# Patient Record
Sex: MALE | Race: WHITE | Employment: FULL TIME | URBAN - NONMETROPOLITAN AREA
[De-identification: names, ages, dates, MRNs, and addresses within clinical notes are randomized per-mention and may not be internally consistent; named-entity substitution may affect disease eponyms.]

---

## 2021-03-01 ENCOUNTER — HOSPITAL ENCOUNTER (INPATIENT)
Age: 51
LOS: 3 days | Discharge: HOME | DRG: 897 | End: 2021-03-04
Attending: INTERNAL MEDICINE | Admitting: INTERNAL MEDICINE
Payer: COMMERCIAL

## 2021-03-01 DIAGNOSIS — F10.930 ALCOHOL WITHDRAWAL SYNDROME WITHOUT COMPLICATION (HCC): ICD-10-CM

## 2021-03-01 PROBLEM — F10.939 ALCOHOL WITHDRAWAL (HCC): Status: ACTIVE | Noted: 2021-03-01

## 2021-03-01 LAB
ALBUMIN SERPL-MCNC: 3.8 G/DL (ref 3.5–4.7)
ALBUMIN/GLOB SERPL: 1.2 {RATIO}
ALP SERPL-CCNC: 60 U/L (ref 38–126)
ALT SERPL-CCNC: 36 U/L (ref 3–72)
AMPHET UR QL SCN: NEGATIVE
ANION GAP SERPL CALC-SCNC: 9 MMOL/L
APPEARANCE UR: CLEAR
AST SERPL W P-5'-P-CCNC: 59 U/L (ref 17–74)
BARBITURATES UR QL SCN: NEGATIVE
BASOPHILS # BLD: 0 K/UL (ref 0–0.1)
BASOPHILS NFR BLD: 0 % (ref 0–2)
BENZODIAZ UR QL: NEGATIVE
BILIRUB SERPL-MCNC: 0.3 MG/DL (ref 0.2–1)
BILIRUB UR QL: NEGATIVE
BUN SERPL-MCNC: 13 MG/DL (ref 9–21)
BUN/CREAT SERPL: 19
CA-I BLD-MCNC: 8.8 MG/DL (ref 8.5–10.5)
CANNABINOIDS UR QL SCN: NEGATIVE
CHLORIDE SERPL-SCNC: 105 MMOL/L (ref 94–111)
CO2 SERPL-SCNC: 23 MMOL/L (ref 21–33)
COCAINE UR QL SCN: NEGATIVE
COLOR UR: NORMAL
COVID-19 RAPID TEST, COVR: NOT DETECTED
CREAT SERPL-MCNC: 0.7 MG/DL (ref 0.8–1.5)
EOSINOPHIL # BLD: 0 K/UL (ref 0–0.4)
EOSINOPHIL NFR BLD: 0 % (ref 0–5)
ERYTHROCYTE [DISTWIDTH] IN BLOOD BY AUTOMATED COUNT: 11.9 % (ref 11.6–14.5)
ETHANOL PERCENT, ALCP: 0.17 %
ETHANOL SERPL-MCNC: 170 MG/DL
GLOBULIN SER CALC-MCNC: 3.3 G/DL
GLUCOSE SERPL-MCNC: 96 MG/DL (ref 70–110)
GLUCOSE UR STRIP.AUTO-MCNC: NEGATIVE MG/DL
HCT VFR BLD AUTO: 39.8 % (ref 36–48)
HGB BLD-MCNC: 13.5 G/DL (ref 13–16)
HGB UR QL STRIP: NEGATIVE
IMM GRANULOCYTES # BLD AUTO: 0 K/UL
IMM GRANULOCYTES NFR BLD AUTO: 0 %
KETONES UR QL STRIP.AUTO: NEGATIVE MG/DL
LEUKOCYTE ESTERASE UR QL STRIP.AUTO: NEGATIVE
LYMPHOCYTES # BLD: 1.6 K/UL (ref 0.9–3.6)
LYMPHOCYTES NFR BLD: 21 % (ref 21–52)
MCH RBC QN AUTO: 31.4 PG (ref 24–34)
MCHC RBC AUTO-ENTMCNC: 33.9 G/DL (ref 31–37)
MCV RBC AUTO: 92.6 FL (ref 74–97)
METHADONE UR QL: NEGATIVE
MONOCYTES # BLD: 0.6 K/UL (ref 0.05–1.2)
MONOCYTES NFR BLD: 8 % (ref 3–10)
NEUTS SEG # BLD: 5.6 K/UL (ref 1.8–8)
NEUTS SEG NFR BLD: 71 % (ref 40–73)
NITRITE UR QL STRIP.AUTO: NEGATIVE
OPIATES UR QL: NEGATIVE
OXYCODONE UR QL SCN: NEGATIVE
PCP UR QL: NEGATIVE
PH UR STRIP: 6 [PH] (ref 5–9)
PLATELET # BLD AUTO: 414 K/UL (ref 135–420)
PMV BLD AUTO: 8.3 FL (ref 9.2–11.8)
POTASSIUM SERPL-SCNC: 3.7 MMOL/L (ref 3.2–5.1)
PROPOXYPH UR QL: NEGATIVE
PROT SERPL-MCNC: 7.1 G/DL (ref 6.1–8.4)
PROT UR STRIP-MCNC: NEGATIVE MG/DL
RBC # BLD AUTO: 4.3 M/UL (ref 4.7–5.5)
SARS-COV-2, COV2: NORMAL
SARS-COV-2, COV2: NORMAL
SODIUM SERPL-SCNC: 137 MMOL/L (ref 135–145)
SP GR UR REFRACTOMETRY: 1.01 (ref 1–1.03)
SPECIMEN SOURCE: NORMAL
TRICYCLICS UR QL: NEGATIVE
UROBILINOGEN UR QL STRIP.AUTO: 0.2 EU/DL (ref 0.2–1)
WBC # BLD AUTO: 7.9 K/UL (ref 4.6–13.2)

## 2021-03-01 PROCEDURE — U0003 INFECTIOUS AGENT DETECTION BY NUCLEIC ACID (DNA OR RNA); SEVERE ACUTE RESPIRATORY SYNDROME CORONAVIRUS 2 (SARS-COV-2) (CORONAVIRUS DISEASE [COVID-19]), AMPLIFIED PROBE TECHNIQUE, MAKING USE OF HIGH THROUGHPUT TECHNOLOGIES AS DESCRIBED BY CMS-2020-01-R: HCPCS

## 2021-03-01 PROCEDURE — 74011000302 HC RX REV CODE- 302: Performed by: INTERNAL MEDICINE

## 2021-03-01 PROCEDURE — 82746 ASSAY OF FOLIC ACID SERUM: CPT

## 2021-03-01 PROCEDURE — 86580 TB INTRADERMAL TEST: CPT | Performed by: INTERNAL MEDICINE

## 2021-03-01 PROCEDURE — 93005 ELECTROCARDIOGRAM TRACING: CPT

## 2021-03-01 PROCEDURE — 80307 DRUG TEST PRSMV CHEM ANLYZR: CPT

## 2021-03-01 PROCEDURE — 80053 COMPREHEN METABOLIC PANEL: CPT

## 2021-03-01 PROCEDURE — 81003 URINALYSIS AUTO W/O SCOPE: CPT

## 2021-03-01 PROCEDURE — 86592 SYPHILIS TEST NON-TREP QUAL: CPT

## 2021-03-01 PROCEDURE — 82077 ASSAY SPEC XCP UR&BREATH IA: CPT

## 2021-03-01 PROCEDURE — 74011250637 HC RX REV CODE- 250/637: Performed by: INTERNAL MEDICINE

## 2021-03-01 PROCEDURE — 82607 VITAMIN B-12: CPT

## 2021-03-01 PROCEDURE — 65310000001 HC RM PRIVATE DETOX

## 2021-03-01 PROCEDURE — 85025 COMPLETE CBC W/AUTO DIFF WBC: CPT

## 2021-03-01 PROCEDURE — 87635 SARS-COV-2 COVID-19 AMP PRB: CPT

## 2021-03-01 RX ORDER — DOCUSATE SODIUM 100 MG/1
100 CAPSULE, LIQUID FILLED ORAL 2 TIMES DAILY
Status: DISCONTINUED | OUTPATIENT
Start: 2021-03-01 | End: 2021-03-01

## 2021-03-01 RX ORDER — BUPROPION HYDROCHLORIDE 100 MG/1
100 TABLET, EXTENDED RELEASE ORAL 2 TIMES DAILY
Status: DISCONTINUED | OUTPATIENT
Start: 2021-03-01 | End: 2021-03-04 | Stop reason: HOSPADM

## 2021-03-01 RX ORDER — LORAZEPAM 1 MG/1
1 TABLET ORAL 3 TIMES DAILY
Status: DISCONTINUED | OUTPATIENT
Start: 2021-03-03 | End: 2021-03-03

## 2021-03-01 RX ORDER — HYDROXYZINE PAMOATE 25 MG/1
25 CAPSULE ORAL
Status: DISCONTINUED | OUTPATIENT
Start: 2021-03-01 | End: 2021-03-04 | Stop reason: HOSPADM

## 2021-03-01 RX ORDER — BUSPIRONE HYDROCHLORIDE 5 MG/1
10 TABLET ORAL 2 TIMES DAILY
Status: DISCONTINUED | OUTPATIENT
Start: 2021-03-01 | End: 2021-03-04 | Stop reason: HOSPADM

## 2021-03-01 RX ORDER — PANTOPRAZOLE SODIUM 40 MG/1
40 TABLET, DELAYED RELEASE ORAL
Status: DISCONTINUED | OUTPATIENT
Start: 2021-03-01 | End: 2021-03-04 | Stop reason: HOSPADM

## 2021-03-01 RX ORDER — VALSARTAN AND HYDROCHLOROTHIAZIDE 320; 25 MG/1; MG/1
1 TABLET, FILM COATED ORAL DAILY
COMMUNITY

## 2021-03-01 RX ORDER — LORAZEPAM 1 MG/1
1 TABLET ORAL EVERY 12 HOURS
Status: DISCONTINUED | OUTPATIENT
Start: 2021-03-03 | End: 2021-03-01

## 2021-03-01 RX ORDER — LORAZEPAM 1 MG/1
2 TABLET ORAL 3 TIMES DAILY
Status: DISCONTINUED | OUTPATIENT
Start: 2021-03-01 | End: 2021-03-03

## 2021-03-01 RX ORDER — CYCLOBENZAPRINE HCL 10 MG
10 TABLET ORAL
Status: DISCONTINUED | OUTPATIENT
Start: 2021-03-01 | End: 2021-03-04 | Stop reason: HOSPADM

## 2021-03-01 RX ORDER — LOSARTAN POTASSIUM 25 MG/1
100 TABLET ORAL DAILY
Status: DISCONTINUED | OUTPATIENT
Start: 2021-03-02 | End: 2021-03-04 | Stop reason: HOSPADM

## 2021-03-01 RX ORDER — HYDROCHLOROTHIAZIDE 25 MG/1
25 TABLET ORAL DAILY
Status: DISCONTINUED | OUTPATIENT
Start: 2021-03-02 | End: 2021-03-04 | Stop reason: HOSPADM

## 2021-03-01 RX ORDER — IBUPROFEN 400 MG/1
400 TABLET ORAL
Status: DISCONTINUED | OUTPATIENT
Start: 2021-03-01 | End: 2021-03-04 | Stop reason: HOSPADM

## 2021-03-01 RX ORDER — OMEPRAZOLE 40 MG/1
40 CAPSULE, DELAYED RELEASE ORAL DAILY
Status: ON HOLD | COMMUNITY
End: 2021-03-01 | Stop reason: ALTCHOICE

## 2021-03-01 RX ORDER — IBUPROFEN 200 MG
1 TABLET ORAL EVERY 24 HOURS
Status: DISCONTINUED | OUTPATIENT
Start: 2021-03-01 | End: 2021-03-03

## 2021-03-01 RX ORDER — BACLOFEN 20 MG
500 TABLET ORAL DAILY
COMMUNITY

## 2021-03-01 RX ORDER — THERA TABS 400 MCG
1 TAB ORAL DAILY
Status: DISCONTINUED | OUTPATIENT
Start: 2021-03-02 | End: 2021-03-04 | Stop reason: HOSPADM

## 2021-03-01 RX ORDER — BUSPIRONE HYDROCHLORIDE 10 MG/1
10 TABLET ORAL 2 TIMES DAILY
COMMUNITY

## 2021-03-01 RX ORDER — LORAZEPAM 1 MG/1
1 TABLET ORAL
Status: DISCONTINUED | OUTPATIENT
Start: 2021-03-05 | End: 2021-03-03

## 2021-03-01 RX ORDER — LANOLIN ALCOHOL/MO/W.PET/CERES
9 CREAM (GRAM) TOPICAL
Status: DISCONTINUED | OUTPATIENT
Start: 2021-03-01 | End: 2021-03-04 | Stop reason: HOSPADM

## 2021-03-01 RX ORDER — BISMUTH SUBSALICYLATE 262 MG
1 TABLET,CHEWABLE ORAL DAILY
Status: ON HOLD | COMMUNITY
End: 2021-03-01 | Stop reason: ALTCHOICE

## 2021-03-01 RX ORDER — LORAZEPAM 1 MG/1
1 TABLET ORAL DAILY
Status: DISCONTINUED | OUTPATIENT
Start: 2021-03-04 | End: 2021-03-01

## 2021-03-01 RX ORDER — FOLIC ACID 1 MG/1
1 TABLET ORAL DAILY
Status: DISCONTINUED | OUTPATIENT
Start: 2021-03-02 | End: 2021-03-04 | Stop reason: HOSPADM

## 2021-03-01 RX ORDER — LOPERAMIDE HYDROCHLORIDE 2 MG/1
4 CAPSULE ORAL
Status: DISCONTINUED | OUTPATIENT
Start: 2021-03-01 | End: 2021-03-04 | Stop reason: HOSPADM

## 2021-03-01 RX ORDER — LORAZEPAM 1 MG/1
1 TABLET ORAL EVERY 12 HOURS
Status: DISCONTINUED | OUTPATIENT
Start: 2021-03-04 | End: 2021-03-03

## 2021-03-01 RX ORDER — LORAZEPAM 1 MG/1
2 TABLET ORAL EVERY 8 HOURS
Status: DISCONTINUED | OUTPATIENT
Start: 2021-03-01 | End: 2021-03-01

## 2021-03-01 RX ORDER — TRAZODONE HYDROCHLORIDE 50 MG/1
100 TABLET ORAL
Status: DISCONTINUED | OUTPATIENT
Start: 2021-03-01 | End: 2021-03-04 | Stop reason: HOSPADM

## 2021-03-01 RX ORDER — CLONIDINE HYDROCHLORIDE 0.1 MG/1
0.1 TABLET ORAL
Status: DISCONTINUED | OUTPATIENT
Start: 2021-03-01 | End: 2021-03-04 | Stop reason: HOSPADM

## 2021-03-01 RX ORDER — ADHESIVE BANDAGE
30 BANDAGE TOPICAL DAILY PRN
Status: DISCONTINUED | OUTPATIENT
Start: 2021-03-01 | End: 2021-03-04 | Stop reason: HOSPADM

## 2021-03-01 RX ORDER — LORAZEPAM 1 MG/1
1 TABLET ORAL EVERY 8 HOURS
Status: DISCONTINUED | OUTPATIENT
Start: 2021-03-02 | End: 2021-03-01

## 2021-03-01 RX ORDER — DOCUSATE SODIUM 100 MG/1
100 CAPSULE, LIQUID FILLED ORAL
Status: DISCONTINUED | OUTPATIENT
Start: 2021-03-01 | End: 2021-03-04 | Stop reason: HOSPADM

## 2021-03-01 RX ORDER — LANOLIN ALCOHOL/MO/W.PET/CERES
400 CREAM (GRAM) TOPICAL DAILY
Status: DISCONTINUED | OUTPATIENT
Start: 2021-03-02 | End: 2021-03-04 | Stop reason: HOSPADM

## 2021-03-01 RX ORDER — BUPROPION HYDROCHLORIDE 100 MG/1
100 TABLET, EXTENDED RELEASE ORAL 2 TIMES DAILY
COMMUNITY

## 2021-03-01 RX ORDER — SUCRALFATE 1 G/1
1 TABLET ORAL
Status: DISCONTINUED | OUTPATIENT
Start: 2021-03-01 | End: 2021-03-04 | Stop reason: HOSPADM

## 2021-03-01 RX ADMIN — TRAZODONE HYDROCHLORIDE 100 MG: 50 TABLET ORAL at 22:11

## 2021-03-01 RX ADMIN — TUBERCULIN PURIFIED PROTEIN DERIVATIVE 5 UNITS: 5 INJECTION, SOLUTION INTRADERMAL at 16:56

## 2021-03-01 RX ADMIN — SUCRALFATE 1 G: 1 TABLET ORAL at 22:10

## 2021-03-01 RX ADMIN — LORAZEPAM 2 MG: 1 TABLET ORAL at 22:11

## 2021-03-01 RX ADMIN — BUSPIRONE HYDROCHLORIDE 10 MG: 5 TABLET ORAL at 18:27

## 2021-03-01 RX ADMIN — SUCRALFATE 1 G: 1 TABLET ORAL at 16:55

## 2021-03-01 RX ADMIN — LORAZEPAM 2 MG: 1 TABLET ORAL at 16:56

## 2021-03-01 RX ADMIN — PANTOPRAZOLE SODIUM 40 MG: 40 TABLET, DELAYED RELEASE ORAL at 16:55

## 2021-03-01 RX ADMIN — BUPROPION HYDROCHLORIDE 100 MG: 100 TABLET, EXTENDED RELEASE ORAL at 18:27

## 2021-03-01 NOTE — PROGRESS NOTES
Received for care 49 y.o. male with admitting diagnosis of Acute Alcohol withdrawal with counseling and rehabilitative serve. Patient is ambulatory, alert and oriented x 4. Report of feeling anxious and shaky. Skin noted moist and flushed. CIWA score 11. Admit to drinking beer daily and last drank 3 hours ago, drinking 24 oz bottle of beer. Request flu vaccine at discharge. COVID-19 test completed. Droplet isolation explained. Patient verbalize understanding. Dr. Kristne Thrasher aware of patient arrival. Admission orders place.

## 2021-03-01 NOTE — PROGRESS NOTES
Problem: Alcohol Withdrawal  Goal: *STG: Complies with medication therapy  Outcome: Progressing Towards Goal   Patient complies with medication

## 2021-03-02 LAB
ATRIAL RATE: 108 BPM
CALCULATED P AXIS, ECG09: 69 DEGREES
CALCULATED R AXIS, ECG10: 70 DEGREES
CALCULATED T AXIS, ECG11: 43 DEGREES
DIAGNOSIS, 93000: NORMAL
FOLATE SERPL-MCNC: 5.1 NG/ML (ref 5–21)
P-R INTERVAL, ECG05: 140 MS
Q-T INTERVAL, ECG07: 332 MS
QRS DURATION, ECG06: 94 MS
QTC CALCULATION (BEZET), ECG08: 444 MS
SARS-COV-2, COV2NT: NOT DETECTED
VENTRICULAR RATE, ECG03: 108 BPM
VIT B12 SERPL-MCNC: 312 PG/ML (ref 193–986)

## 2021-03-02 PROCEDURE — 65310000001 HC RM PRIVATE DETOX

## 2021-03-02 PROCEDURE — 99222 1ST HOSP IP/OBS MODERATE 55: CPT | Performed by: INTERNAL MEDICINE

## 2021-03-02 PROCEDURE — 74011250637 HC RX REV CODE- 250/637: Performed by: INTERNAL MEDICINE

## 2021-03-02 RX ADMIN — BUPROPION HYDROCHLORIDE 100 MG: 100 TABLET, EXTENDED RELEASE ORAL at 18:00

## 2021-03-02 RX ADMIN — THERA TABS 1 TABLET: TAB at 09:10

## 2021-03-02 RX ADMIN — LOSARTAN POTASSIUM 100 MG: 25 TABLET, FILM COATED ORAL at 09:09

## 2021-03-02 RX ADMIN — LORAZEPAM 2 MG: 1 TABLET ORAL at 09:10

## 2021-03-02 RX ADMIN — BUPROPION HYDROCHLORIDE 100 MG: 100 TABLET, EXTENDED RELEASE ORAL at 09:13

## 2021-03-02 RX ADMIN — HYDROCHLOROTHIAZIDE 25 MG: 25 TABLET ORAL at 09:11

## 2021-03-02 RX ADMIN — BUSPIRONE HYDROCHLORIDE 10 MG: 5 TABLET ORAL at 09:12

## 2021-03-02 RX ADMIN — SUCRALFATE 1 G: 1 TABLET ORAL at 21:15

## 2021-03-02 RX ADMIN — SUCRALFATE 1 G: 1 TABLET ORAL at 11:24

## 2021-03-02 RX ADMIN — LORAZEPAM 2 MG: 1 TABLET ORAL at 21:15

## 2021-03-02 RX ADMIN — SUCRALFATE 1 G: 1 TABLET ORAL at 17:10

## 2021-03-02 RX ADMIN — FOLIC ACID 1 MG: 1 TABLET ORAL at 09:11

## 2021-03-02 RX ADMIN — LORAZEPAM 2 MG: 1 TABLET ORAL at 16:00

## 2021-03-02 RX ADMIN — SUCRALFATE 1 G: 1 TABLET ORAL at 06:34

## 2021-03-02 RX ADMIN — PANTOPRAZOLE SODIUM 40 MG: 40 TABLET, DELAYED RELEASE ORAL at 06:34

## 2021-03-02 RX ADMIN — TRAZODONE HYDROCHLORIDE 100 MG: 50 TABLET ORAL at 21:14

## 2021-03-02 RX ADMIN — BUSPIRONE HYDROCHLORIDE 10 MG: 5 TABLET ORAL at 17:10

## 2021-03-02 RX ADMIN — IBUPROFEN 400 MG: 400 TABLET, FILM COATED ORAL at 11:24

## 2021-03-02 RX ADMIN — Medication 400 MG: at 09:11

## 2021-03-02 NOTE — PROGRESS NOTES
Introduced myself to patient, also set the expectations of what a PRS does. Shared a little about my path and journey, will continue with a big book introduction and meeting tomorrow.

## 2021-03-02 NOTE — GROUP NOTE
[facility] GROUP DOCUMENTATION INDIVIDUAL Group Therapy Note Date: 3/2/2021 Group Start Time: 6389 Group End Time: 1430 Group Topic: Education Group - Inpatient SHF 3 DETOX Gianna Worley [facility] GROUP DOCUMENTATION GROUP Group Therapy Note Attendees: 3 Attendance: Attended Patient's Goal: Attends activities and groups, Will identify negative impact of chemical dependency including the use of tobacco, alcohol, and other substances, Verbalizes abstinence as an achievable goal 
 
Interventions/techniques: Challenged, Informed, Validated, Promoted peer support, Provide feedback, Reinforced and Supported Follows Directions: Followed directions Interactions: Interacted appropriately Mental Status: Calm Behavior/appearance: Attentive and Cooperative Goals Achieved: Able to engage in interactions, Able to listen to others, Able to reflect/comment on own behavior, Able to receive feedback, Able to self-disclose and Discussed discharge plans Additional Notes:  
 
D- Staff is supporting Gutierrez in learning about how People, Places, and Things can become triggers for relapse. Gutierrez actively participated in the group discussion about  People, Places, and Things and Triggers. He shared interventions for his triggers that he identified. He appeared to be experiencing some moderate withdrawal symptoms . He shared that he was feeling  tired. Felix Tompkins  was pleasant and cooperative in his affect. He appeared to be experiencing some moderate withdrawal symptoms. He is actively working toward the completion of  his treatment goals. P-  Staff Is supporting Erin Ghotra in learning about triggers and people, places, and things and their importance in remaining without relapse. Josue Orellana

## 2021-03-02 NOTE — PROGRESS NOTES
Problem: Alcohol Withdrawal  Goal: *STG: Seeks staff when symptoms of withdrawal increase  Outcome: Progressing Towards Goal   Patient educated on symptoms of withdrawals and encouraged to report any symptoms ot staff. He verbalized understanding. Plan to continue with hourly  checks and encourage  active communication with patient.

## 2021-03-02 NOTE — GROUP NOTE
[facility] GROUP DOCUMENTATION INDIVIDUAL Group Therapy Note Date: 3/2/2021 Group Start Time: 5441 Group End Time: 1000 Group Topic: [employer] SHF 3 DETOX Sarah Hoover [facility] GROUP DOCUMENTATION GROUP Group Therapy Note Topic: Community & Spirituality Attendees: 2 Attendance: Attended Patient's Goal: attend groups and activities Interventions/techniques: Informed Follows Directions: Followed directions Interactions: Interacted appropriately Mental Status: Anxious Behavior/appearance: Attentive and Neatly groomed Goals Achieved: Able to engage in interactions, Able to listen to others, Able to give feedback to another, Able to receive feedback, Discussed discharge plans and Identified resources and support systems Additional Notes:   
D:  The counselor discussed the daily schedule, reviewed program rules and regulations with the patient. The patient reported alcohol withdrawal symptoms. The patient expressed that he did not have an interest in attending Residential Treatment after discharge. The counselor and patient discussed treatment options that will support the patient treatment needs. A:  The patient withdrawal symptoms are still present as evidence by self-report and observation. He reports and interest in continuing treatment at [facility] upon discharge. The patient appeared to be somewhat anxious as evidenced by observation of his his continued fidgeting during group. Prabhu Baez had a concern of wanting to use the phone to pay his rent. P:  The medical staff will continue to monitor and assess the patients withdrawal symptoms. Prabhu Baez reported that he had an appointment sometime this week with [facility], in [place]. The counselor will contact [place] [phone] to inquire about appoint and set up follow on treatment for [facility] and [address]

## 2021-03-02 NOTE — H&P
History and Physical    Patient: Anyi Hoang MRN: [id number]  SSN: [id number]    YOB: 1970  Age: 49 y.o. Sex: male      Cc: alcohol withdrawal      Assessment/Plan:     1. Alcohol withdrawal without complications:  CIWA on presentation was 11 Ativan taper was initiated as current CIWA is now down to approximately 6. We will continue Ativan taper and start cognitive behavioral therapy. We will continue to provide medications for symptom relief as well    2. Essential htn  Continue Diovan HCTZ. Will write for clonidine as needed    3. Anxiety disorder  Continue outpatient BuSpar    4. Depression  Continue Wellbutrin    5. GERD  Start Protonix    Patient is appropriate for Level 3.7 -[facility] Medically Monitored Inpatient Withdrawal Management. Patient is admitted for acute alcohol withdrawals    I certify this patient will require greater than 2 midnight stay in order to treat his alcohol withdrawals and for aggressive cognitive behavioral therapy    Subjective:      Anyi Hoang is a 49 y.o. male who presents in acute alcohol withdrawal.  The patient CIWA on presentation was 6. The patient on presentation was complaining of anxiety tremulous and sweats. His last alcoholic beverage was approximately 2 to 4 hours before his presentation. He reports drinking approximately 12 beers per day and he has been doing this for years. He notes he has a longstanding history of anxiety as well for which he takes BuSpar which normally helps resolve some of his symptoms. He denies any chest pain palpitations or shortness of breath. He does report indigestion which is chronic for him and he normally takes omeprazole. He denies any dysuria or change in urinary frequency.   He reports he is very motivated to get off of alcohol and is interested in a possible 30-day program versus intensive outpatient rehab    Past Medical History:   Diagnosis Date    Anxiety disorder     Depression     GERD (gastroesophageal reflux disease)     Hypertension     Substance abuse ([facility] [address]     surg hx  egd with bx in   Family hx  - negative for dm or cad in family members    Social History     Tobacco Use    Smoking status: Former Smoker     Years: 20.00     Quit date: 3/26/2020     Years since quittin.9    Smokeless tobacco: Never Used   Substance Use Topics    Alcohol use: Not Currently     Alcohol/week: 24.0 standard drinks     Types: 12 Cans of beer, 12 Standard drinks or equivalent per week     Frequency: 4 or more times a week     Drinks per session: 10 or more     Binge frequency: Daily or almost daily      Prior to Admission medications    Medication Sig Start Date End Date Taking? Authorizing Provider   valsartan-hydroCHLOROthiazide (DIOVAN-HCT) 320-25 mg per tablet Take 1 Tab by mouth daily. Yes Provider, Historical   busPIRone (BUSPAR) 10 mg tablet Take 10 mg by mouth two (2) times a day. Yes Provider, Historical   buPROPion SR (WELLBUTRIN SR) 100 mg SR tablet Take 100 mg by mouth two (2) times a day. Yes Provider, Historical   magnesium oxide 500 mg tab Take 500 mg by mouth daily. Yes Provider, Historical        No Known Allergies    Review of Systems:  Gen: + for slight weight loss, + sweats no fevers  Heent: slight h/a, no vision changes, no sore throat  Cv: no cp or palpitations  Resp: no sob or cough  abd + indigestion, no n/v.  No diarrhea  Ext: no parasthesia, weakness, swelling   Gu: no dysuria or changes in freq  Psych: + anxiety and + depression  Skin: no rashes or lesions  MS: no problems with gait or cervical pain      Objective:     Vitals:    21 1519 21 2030 21 0000 21 0400   BP: 135/75 137/87 (!) 128/95 (!) 140/88   Pulse: (!) 121 (!) 108 91 100   Resp: 18 18 17 18   Temp: 97.9 °F (36.6 °C) 97 °F (36.1 °C) 97.4 °F (36.3 °C) 97.7 °F (36.5 °C)   SpO2: 99% 100% 98% 98%   Weight: 161 lb (73 kg)      Height: 5' 9\" (1.753 m)           Physical Exam:  General: alert awake well-oriented, no acute distress. HEENT: EOMI, no Icterus, no Pallor,  mucosa moist.  Neck: Neck is supple, No JVD  Lungs: breathsounds normal, no respiratory distress on inspection, no rhonchi, no rales,   CVS: heart sounds normal, regular rate and rhythm, no murmurs, no rubs. GI: soft, nontender, normal BS. Extremeties: no cyanosis, no edema,   Neuro: Alert, awake, oriented x3, No new focal deficits, moving all extremeties well. Skin: normal skin turgor, no skin rashes. Lab/Data Review:  Results for Maria T Jurado" (MRN [id number]) as of 3/2/2021 07:31   Ref. Range 3/1/2021 14:10 3/1/2021 14:45   WBC Latest Ref Range: 4.6 - 13.2 K/uL  7.9   RBC Latest Ref Range: 4.70 - 5.50 M/uL  4.30 (L)   HGB Latest Ref Range: 13.0 - 16.0 g/dL  13.5   HCT Latest Ref Range: 36.0 - 48.0 %  39.8   MCV Latest Ref Range: 74.0 - 97.0 FL  92.6   MCH Latest Ref Range: 24.0 - 34.0 PG  31.4   MCHC Latest Ref Range: 31.0 - 37.0 g/dL  33.9   RDW Latest Ref Range: 11.6 - 14.5 %  11.9   PLATELET Latest Ref Range: 135 - 420 K/uL  414   MPV Latest Ref Range: 9.2 - 11.8 FL  8.3 (L)   NEUTROPHILS Latest Ref Range: 40 - 73 %  71   LYMPHOCYTES Latest Ref Range: 21 - 52 %  21   MONOCYTES Latest Ref Range: 3 - 10 %  8   EOSINOPHILS Latest Ref Range: 0 - 5 %  0   BASOPHILS Latest Ref Range: 0 - 2 %  0   IMMATURE GRANULOCYTES Latest Units: %  0   ABS. NEUTROPHILS Latest Ref Range: 1.8 - 8.0 K/UL  5.6   ABS. IMM. GRANS. Latest Units: K/UL  0.0   ABS. LYMPHOCYTES Latest Ref Range: 0.9 - 3.6 K/UL  1.6   ABS. MONOCYTES Latest Ref Range: 0.05 - 1.2 K/UL  0.6   ABS. EOSINOPHILS Latest Ref Range: 0.0 - 0.4 K/UL  0.0   ABS.  BASOPHILS Latest Ref Range: 0.0 - 0.1 K/UL  0.0   Color Latest Units:    Yellow/Straw   Appearance Latest Ref Range: Clear    Clear   Specific gravity Latest Ref Range: 1.003 - 1.035    1.015   pH (UA) Latest Ref Range: 5.0 - 9.0    6.0   Protein Latest Ref Range: Negative mg/dL  Negative   Glucose Latest Ref Range: Negative mg/dL  Negative   Ketone Latest Ref Range: Negative mg/dL  Negative   Blood Latest Ref Range: Negative    Negative   Bilirubin Latest Ref Range: Negative    Negative   Urobilinogen Latest Ref Range: 0.2 - 1.0 EU/dL  0.2   Nitrites Latest Ref Range: Negative    Negative   Leukocyte Esterase Latest Ref Range: Negative    Negative   Sodium Latest Ref Range: 135 - 145 mmol/L  137   Potassium Latest Ref Range: 3.2 - 5.1 mmol/L  3.7   Chloride Latest Ref Range: 94 - 111 mmol/L  105   CO2 Latest Ref Range: 21 - 33 mmol/L  23   Anion gap Latest Units: mmol/L  9   Glucose Latest Ref Range: 70 - 110 mg/dL  96   BUN Latest Ref Range: 9 - 21 mg/dL  13   Creatinine Latest Ref Range: 0.8 - 1.50 mg/dL  0.70 (L)   BUN/Creatinine ratio Latest Units:    19   Calcium Latest Ref Range: 8.5 - 10.5 mg/dL  8.8   GFR est non-AA Latest Units: ml/min/1.73m2  >60   GFR est AA Latest Units: ml/min/1.73m2  >60   Bilirubin, total Latest Ref Range: 0.2 - 1.0 mg/dL  0.3   Protein, total Latest Ref Range: 6.1 - 8.4 g/dL  7.1   Albumin Latest Ref Range: 3.5 - 4.7 g/dL  3.8   Globulin Latest Units: g/dL  3.3   A-G Ratio Latest Units:    1.2   ALT Latest Ref Range: 3 - 72 U/L  36   AST Latest Ref Range: 17 - 74 U/L  59   Alk.  phosphatase Latest Ref Range: 38 - 126 U/L  60   ALCOHOL(ETHYL),SERUM Latest Ref Range: <4 mg/dL  170 (H)   Ethanol, percent Latest Ref Range: <0.004 %  0.165 (H)   AMPHETAMINES Latest Ref Range: Negative    Negative   BARBITURATES Latest Ref Range: Negative    Negative   BENZODIAZEPINES Latest Ref Range: Negative    Negative   COCAINE Latest Ref Range: Negative    Negative   METHADONE Latest Ref Range: Negative    Negative   OPIATES Latest Ref Range: Negative    Negative   OXYCODONE SCREEN Latest Ref Range: Negative    Negative   PCP(PHENCYCLIDINE) Latest Ref Range: Negative    Negative   PROPOXYPHENE Latest Ref Range: Negative    Negative   THC (TH-CANNABINOL) Latest Ref Range: Negative    Negative TRICYCLICS Latest Ref Range: Negative    Negative   DRUG SCREEN, URINE Unknown  Rpt   SARS-COV-2 Unknown Rpt      No results found for this or any previous visit (from the past 15 hour(s)).         Signed By: Cinthia Jones MD     March 2, 2021

## 2021-03-02 NOTE — GROUP NOTE
[facility] GROUP DOCUMENTATION INDIVIDUAL Group Therapy Note Date: 3/2/2021 Group Start Time: 1000 Group End Time: 3274 Group Topic: Education Group - Inpatient SHF 3 DETOX Latrice Joshi [facility] GROUP DOCUMENTATION GROUP Group Therapy Note Attendees: 2 Attendance: Attended Patient's Goal:  Identify lifestyle changes to support long term sobriety such as vocation, employment, education and legal issues Interventions/techniques: Informed and Supported Follows Directions: Followed directions Interactions: Interacted appropriately Mental Status: Anxious Behavior/appearance: Agitated and Neatly groomed Goals Achieved: Able to engage in interactions, Able to listen to others, Able to give feedback to another, Identified relapse prevention strategies and Identified resources and support systems Additional Notes:   
D: Patient was supported in the discussion of the Stages of Changes. There are five main stages in the Stages of Change: Precontemplation, contemplation, preparation, action and maintenance. Relapse can occur at or between stages. Kelsy Whipple was supported in sharing his his struggle with alcoholism. A:The patient is progressing towards the treatment goals. He was supported in completing an activity to help him in determining where he is in the change process. He was able to verbalize that detox is not treatment but a medical necessity. He is assessed as being in the pre-contemplation stage of change. P: The medical staff will continue to monitor and assess the patients withdrawal symptoms. The patient will continue to participate in groups and activities. He is progressing towards his treatment goals. Fareed Taylor

## 2021-03-02 NOTE — ROUTINE PROCESS
Bedside and Verbal shift change report given to GABRIEL Cintron RN (oncoming nurse) by Sheri Mejia RN (offgoing nurse). Report included the following information Intake/Output, MAR, Recent Results and Quality Measures.

## 2021-03-02 NOTE — ROUTINE PROCESS
Verbal shift change report given to CICI Davis RN (oncoming nurse) by ED Mckoy RN (offgoing nurse). Report included the following information SBAR, Kardex, Procedure Summary, Intake/Output, MAR, Recent Results and Quality Measures.

## 2021-03-02 NOTE — PROGRESS NOTES
Multidisciplinary meeting completed with Dr. Kyler Centeno. GAETANO Cintron and MILADY Herrmann. Advised to continue with present Detox taper. Plan is to assist patient with progression towards treatment goals to include Relapse prevention, and stage of change, coping skills and the 12 Step process. All in agreement with plan.

## 2021-03-02 NOTE — PROGRESS NOTES
Patient reported having a headache 5/10 on scale at the time his CIWA score was 11. Ibuprofen 400mg given as per order. Patient states it as effective and pain is now 0.

## 2021-03-02 NOTE — PROGRESS NOTES
[facility]  GROUP DOCUMENTATION GROUP    Group Therapy Note    Attendees: 2  Shift 2 DETOX  Loan Preciado RN    Topic:  Importance of a Good Routine (Hygiene)    Attendees:  2  Attendance:  Attended  Patient's Goal:  Attend groups and activities  Interventions/techniques:  Informed  Follows Directions: Followed Directions  Interactions:  Interacted appropriately  Mental Status:  Anxious  Behavior/Appearance:  Attentive and Well [place]    Goals Achieved:  Able to engage in interactions, Able to listen to others, able to give feedback to another, able to receive feedback, discussed d/c plans and identified resources and support systems    Both attendees were calm and cooperative. Both actively listened and participated. Both were appropriate. We discussed the importance of having a good routine. With a focus on personal hygiene, sleep hygiene, good nutrition and sleep,and exercise. Patients withdrawal symptoms are still present as evidenced by self report and observation. Patient appeared somewhat anxious as evidenced by fidgeting during group and some difficulty sitting still. The medical staff will continue to monitor and assess the patients withdrawal symptoms.

## 2021-03-02 NOTE — PROGRESS NOTES
Problem: Falls - Risk of  Goal: *Absence of Falls  Description: Document Allison Elsie Fall Risk and appropriate interventions in the flowsheet. Outcome: Progressing Towards Goal  Note: Fall Risk Interventions:            Medication Interventions: Teach patient to arise slowly                   Problem: Alcohol Withdrawal  Goal: *STG: Participates in treatment plan  Outcome: Progressing Towards Goal  Goal: *STG: Remains safe in hospital  Outcome: Progressing Towards Goal  Goal: *STG: Seeks staff when symptoms of withdrawal increase  Outcome: Progressing Towards Goal  Goal: *STG: Complies with medication therapy  Outcome: Progressing Towards Goal  Goal: *STG: Attends activities and groups  Outcome: Progressing Towards Goal  Goal: *STG: Will identify negative impact of chemical dependency including the use of tobacco, alcohol, and other substances  Outcome: Progressing Towards Goal  Goal: *STG: Verbalizes abstinence as an achievable goal  Outcome: Progressing Towards Goal  Goal: *STG: Agrees to participate in outpatient after care program to support ongoing mental health  Outcome: Progressing Towards Goal  Goal: *STG: Able to indentify relapse triggers including interpersonal/social and familial factors  Outcome: Progressing Towards Goal  Goal: *STG: Identify lifestyle changes to support long term sobriety such as vocation, employment, education, and legal issues  Outcome: Progressing Towards Goal  Goal: *STG: Maintains appropriate nutrition and hydration  Outcome: Progressing Towards Goal  Goal: *STG: Vital signs within defined limits  Outcome: Progressing Towards Goal  Goal: *STG/LTG: Relapse prevention plan in place to include housing/aftercare, leisure activities, and spirituality  Outcome: Progressing Towards Goal  Goal: Interventions  Outcome: Progressing Towards Goal   Patient appears to be compliant with unit procedures .

## 2021-03-02 NOTE — ROUTINE PROCESS
shift change report given to Elvira Gunter RN by Elise Kolb RN. Report included the following information Kardex, MAR and Recent Results.

## 2021-03-02 NOTE — BH NOTES
Chemical Dependency/Substance Abuse Therapist/Counselor Documentation    /THERAPIST PROGRESS NOTE    CURRENT STATUS OF PATIENT: Orientation (check one) [x] Person [x] Place [x] Time [x] Purpose    (Patient voiced concerns: - No concerns voiced)    Attitude/Behavior toward Examiner:   [x] Appropriate [x] Cooperative [] Aggressive [] Argumentative [] Angry [] Apathetic [] Passive  [] Childlike [] Interactive [] Guarded [] Demanding [] Dramatic [] Evasive [] Hostile [] Irritable [] Manipulative   [] Uncooperative [] Difficult to redirect [] Isolative-Withdrawal [] Sad-Tearful [] Suspicious [] Defensive    TREATMENT PLAN PROBLEM BEING ADDRESS: Medical Detox and Relapse Prevention    MODALITY:  [x] Individual Therapy  [x] Group Therapy  [] Family Therapy with Client Present  [] Family Therapy without Client Present  [] Multi-Family Group Therapy  [x] Spirituality Psychotherapy Group  [x] Psycho-Edutherapy Group    RISK OF HARM:    [x] None identified    [] Self injurious behavior  [] Threatening others without a plan   [] Actively Suicidal with plan  [] Threatening others with plan  [] Suicidal Thoughts without plan [] Weapon in the home  [] Means to complete a plan    Comments:Participated in community and Spirituality Group, individual sessions, discharge planning, participated in morning groups and evening groups.      General Appearance: [x] Normal [] Disheveled [] Emaciated [] Obese [] Poor Hygiene    Dress: [x] Appropriate [] Eccentric [] Seductive [] Bizarre    Mood: [x] Euthymic/normal [] Dysphoric/unease [] Anxious [] Agitated [] Dysthymic [] Depressed [] Euphoric [] Pleasant  [] Bright [] Angry [] Manic    Affect: [x] Appropriate [] Inappropriate [] Labile [] Constricted [] Blunted [] Flat [] Lethargic [] Preoccupied    THOUGHT CONTENT:   [x] Remains focused on topic/thought control [] Unable to remain focused on topic [] Blocking [] Disorganized  [] Confused [] Preoccupied [] Flight of ideas  [] Tangential [] Delusional thought content [] Persecutory  [] Bizarre [] Grandeur [] Guilt [] Somatic     PERCEPTION: [x] Normal [] Hallucinations [] Auditory [] Visual [] Tactile [] Olfactory/smell [] Gustatory/taste    Briefly summarize the specifics of the identified symptoms and/or behaviors listed and progress towards improvement and/or group interactions:Patient is expressing wanting to go to outpatient treatment after detox is completed. He does not want to put his job at risk by leaving for any lengthy peroid. MET (Motivational Enhancement Therapy): Based on discussions and interactions with the patient the patient falls within the following stage of treatment:   [] Pre-contemplation: Denial of illness with no need to change  [x] Contemplation: Awareness of problem and considering change  [] Determination: Willing to change and open to possible change  [] Action: Actively involved in recovery/treatment demonstrates a willing to make plans to change  [] Relapse Prevention: Able to verbalize plans to prevent relapse, 7 day plan    Briefly summarize the patient's interactions/discussions indicating the current or change in level of therapeutic treatment:  Patient is actively participating in the therapeutic treatment. TREATMENT PROVIDED:  [] Developed/Reviewed Crisis Prevention Plan  [] Discussed/Reviewed Treatment Goals on Treatment Plan  [x] Discussed/Reviewed Discharge Planning   [] Discussed/Reviewed the patient goals of treatment  [] Refused/Unable to participate in discharge planning [] Other:    RECOMMENDATIONS: [] Change current therapeutic focus  [] Change treatment goals/objectives on the treatment plan - No changes recommended for treatment goals or focus. CONTINUED PLAN OF CARE: Continue Current Plan of Care.     DISCHARGE PLANNING:   [] Has identified a family support system   [] Patient has not identified a family support system  [x] Has connected with sober/clean support system  [] Patient has not connected with sober/clean support system  [] Patient uses special equipment at home and equipment is in the home  [] Has identified community support    [] Patient has not been able to identify community support  Barriers to Discharge:  [] Difficulty returning to present living arrangement  [] Will require assistance with placement post discharge  [] There is no local substance abuse disorder programs available to the patient  [x] Needs referral to local CSB. Discharge Planning Comments: Raegan Cunningham has contacted [facility] as to being linked to outpatient SA treatment and groups after his completion of medical detox. He is not able to attend a 28 day program due to wanting to not put his job of 20 years at risk.   Cassandra Anderson, CLIVE, CSAC, CARMEL

## 2021-03-03 LAB — RPR SER QL: NONREACTIVE

## 2021-03-03 PROCEDURE — 99231 SBSQ HOSP IP/OBS SF/LOW 25: CPT | Performed by: INTERNAL MEDICINE

## 2021-03-03 PROCEDURE — 65310000001 HC RM PRIVATE DETOX

## 2021-03-03 PROCEDURE — 74011250637 HC RX REV CODE- 250/637: Performed by: INTERNAL MEDICINE

## 2021-03-03 RX ORDER — IBUPROFEN 200 MG
1 TABLET ORAL DAILY
Status: DISCONTINUED | OUTPATIENT
Start: 2021-03-04 | End: 2021-03-03

## 2021-03-03 RX ORDER — CALCIUM CARBONATE 200(500)MG
200 TABLET,CHEWABLE ORAL
Status: DISCONTINUED | OUTPATIENT
Start: 2021-03-03 | End: 2021-03-04 | Stop reason: HOSPADM

## 2021-03-03 RX ORDER — LORAZEPAM 1 MG/1
1 TABLET ORAL 2 TIMES DAILY
Status: DISCONTINUED | OUTPATIENT
Start: 2021-03-03 | End: 2021-03-04 | Stop reason: HOSPADM

## 2021-03-03 RX ORDER — IBUPROFEN 200 MG
1 TABLET ORAL DAILY
Status: DISCONTINUED | OUTPATIENT
Start: 2021-03-03 | End: 2021-03-03

## 2021-03-03 RX ADMIN — ANTACID TABLETS 200 MG: 500 TABLET, CHEWABLE ORAL at 20:41

## 2021-03-03 RX ADMIN — BUSPIRONE HYDROCHLORIDE 10 MG: 5 TABLET ORAL at 09:00

## 2021-03-03 RX ADMIN — HYDROCHLOROTHIAZIDE 25 MG: 25 TABLET ORAL at 09:00

## 2021-03-03 RX ADMIN — SUCRALFATE 1 G: 1 TABLET ORAL at 11:18

## 2021-03-03 RX ADMIN — TRAZODONE HYDROCHLORIDE 100 MG: 50 TABLET ORAL at 21:15

## 2021-03-03 RX ADMIN — LOSARTAN POTASSIUM 100 MG: 25 TABLET, FILM COATED ORAL at 08:59

## 2021-03-03 RX ADMIN — Medication 400 MG: at 09:00

## 2021-03-03 RX ADMIN — THERA TABS 1 TABLET: TAB at 09:00

## 2021-03-03 RX ADMIN — LORAZEPAM 1 MG: 1 TABLET ORAL at 18:32

## 2021-03-03 RX ADMIN — BUSPIRONE HYDROCHLORIDE 10 MG: 5 TABLET ORAL at 18:33

## 2021-03-03 RX ADMIN — SUCRALFATE 1 G: 1 TABLET ORAL at 06:32

## 2021-03-03 RX ADMIN — BUPROPION HYDROCHLORIDE 100 MG: 100 TABLET, EXTENDED RELEASE ORAL at 18:00

## 2021-03-03 RX ADMIN — HYDROXYZINE PAMOATE 25 MG: 25 CAPSULE ORAL at 00:55

## 2021-03-03 RX ADMIN — FOLIC ACID 1 MG: 1 TABLET ORAL at 09:00

## 2021-03-03 RX ADMIN — SUCRALFATE 1 G: 1 TABLET ORAL at 17:24

## 2021-03-03 RX ADMIN — SUCRALFATE 1 G: 1 TABLET ORAL at 21:15

## 2021-03-03 RX ADMIN — Medication 9 MG: at 21:15

## 2021-03-03 RX ADMIN — LORAZEPAM 1 MG: 1 TABLET ORAL at 09:03

## 2021-03-03 RX ADMIN — ANTACID TABLETS 200 MG: 500 TABLET, CHEWABLE ORAL at 19:24

## 2021-03-03 RX ADMIN — PANTOPRAZOLE SODIUM 40 MG: 40 TABLET, DELAYED RELEASE ORAL at 06:32

## 2021-03-03 RX ADMIN — Medication 9 MG: at 00:00

## 2021-03-03 RX ADMIN — BUPROPION HYDROCHLORIDE 100 MG: 100 TABLET, EXTENDED RELEASE ORAL at 09:00

## 2021-03-03 NOTE — GROUP NOTE
[facility] GROUP DOCUMENTATION INDIVIDUAL Group Therapy Note Date: 3/3/2021 Group Start Time: 3275 Group End Time: 1100 Group Topic: Relapse Prevention/Role Play Group SHF 3 DETOX Pratibha Gruber [facility] GROUP DOCUMENTATION GROUP Group Therapy Note Topic: Triggers Attendees: 2 Attendance: Attended Patient's Goal: Identify triggers Interventions/techniques: Informed and Supported Follows Directions: Followed directions Interactions: Interacted appropriately Mental Status: Anxious Behavior/appearance: Agitated Goals Achieved: Able to engage in interactions, Able to listen to others, Able to give feedback to another, Discussed coping, Identified triggers, Identified relapse prevention strategies and Identified resources and support systems Additional Notes:  
 
 D: Patient was supported in the discussion and activity in defining what triggers are, and exploring and identifying triggers such as: emotional state, people, places, things, thoughts and activities/situations. Shyannubaldo Jordan was supported in identifying his triggers. A: The patient withdrawal symptoms are still present as evidence by self-report and observation. He is still experiencing some anxiety. He was able to verbalize and understanding of how to deal with his triggers. P: The medical staff will continue to monitor and assess the patients withdrawal symptoms. The patient will continue to participate in groups and activities. He is progressing towards his treatment goals. Aysha Saini

## 2021-03-03 NOTE — GROUP NOTE
[facility] GROUP DOCUMENTATION INDIVIDUAL Group Therapy Note Date: 3/3/2021 Group Start Time: 46 Group End Time: 0806 Group Topic: [employer] SHF 3 DETOX Noemy Mae [facility] GROUP DOCUMENTATION GROUP Group Therapy Note Topic: Community Attendees: 2 Attendance: Attended Patient's Goal:  Attends groups and activities Interventions/techniques: Informed and Supported Follows Directions: Followed directions Interactions: Interacted appropriately Mental Status: Anxious Behavior/appearance: Attentive and Cooperative Goals Achieved: Able to engage in interactions, Able to listen to others, Able to give feedback to another, Able to receive feedback, Able to self-disclose, Discussed discharge plans and Identified resources and support systems Additional Notes:   
D:  The counselor discussed the daily schedule, reviewed program rules and regulations with the patient. The patient reported alcohol withdrawal symptoms. The counselor and patient discussed treatment options that will support the patient treatment needs. A:  The patient withdrawal symptoms are still present as evidence by self-report and observation. The patient is also experiencing some anxiety this morning. Reports that he wants to be able to go and smoke. P:  The medical staff will continue to monitor and assess the patients withdrawal symptoms. Counselor will also explore services with Jose Ramon for [facility] and [facility]. Amelia Palencia

## 2021-03-03 NOTE — PROGRESS NOTES
Progress Note    Patient: Aleah Fry MRN: [id number]  SSN: [id number]    YOB: 1970  Age: 49 y.o. Sex: male      Admit Date: 3/1/2021    LOS: 2 days     Assessment and Plan:     1. Alcohol withdrawal without complications:  - withdrawals are being controlled with ativan. ciwa is at 6. His htn is improving now that he's on day 3. No s/o dts.     2. Essential htn  Continue Diovan HCTZ. Will write for clonidine as needed  Much of his bp spikes are due to withdrawals. Now that he is past the worst of this, his bp's should remain stable.     3. Anxiety disorder  Continue outpatient BuSpar     4. Depression  Continue Wellbutrin     5. GERD  Start Protonix              Subjective:   Feels better. No cp, sob, cough, n/v/d.          Current Facility-Administered Medications   Medication Dose Route Frequency    loperamide (IMODIUM) capsule 4 mg  4 mg Oral Q4H PRN    ibuprofen (MOTRIN) tablet 400 mg  400 mg Oral Q4H PRN    magnesium hydroxide (MILK OF MAGNESIA) 400 mg/5 mL oral suspension 30 mL  30 mL Oral DAILY PRN    nicotine (NICODERM CQ) 21 mg/24 hr patch 1 Patch  1 Patch TransDERmal U15X    folic acid (FOLVITE) tablet 1 mg  1 mg Oral DAILY    therapeutic multivitamin (THERAGRAN) tablet 1 Tab  1 Tab Oral DAILY    traZODone (DESYREL) tablet 100 mg  100 mg Oral QHS    sucralfate (CARAFATE) tablet 1 g  1 g Oral AC&HS    pantoprazole (PROTONIX) tablet 40 mg  40 mg Oral ACB    melatonin tablet 9 mg  9 mg Oral QHS PRN    hydrOXYzine pamoate (VISTARIL) capsule 25 mg  25 mg Oral TID PRN    cyclobenzaprine (FLEXERIL) tablet 10 mg  10 mg Oral TID PRN    cloNIDine HCL (CATAPRES) tablet 0.1 mg  0.1 mg Oral Q6H PRN    LORazepam (ATIVAN) tablet 2 mg  2 mg Oral TID    Followed by   [facility] LORazepam (ATIVAN) tablet 1 mg  1 mg Oral TID    Followed by   Dannial Redhead ON 3/4/2021] LORazepam (ATIVAN) tablet 1 mg  1 mg Oral Q12H    Followed by   Dannial Redhead ON 3/5/2021] LORazepam (ATIVAN) tablet 1 mg  1 mg Oral QHS    buPROPion [facility] SR) tablet 100 mg  100 mg Oral BID    busPIRone (BUSPAR) tablet 10 mg  10 mg Oral BID    magnesium oxide (MAG-OX) tablet 400 mg  400 mg Oral DAILY    losartan (COZAAR) tablet 100 mg  100 mg Oral DAILY    And    hydroCHLOROthiazide (HYDRODIURIL) tablet 25 mg  25 mg Oral DAILY    docusate sodium (COLACE) capsule 100 mg  100 mg Oral DAILY PRN    influenza vaccine 2020-21 (36 mos+)(PF) (AFLURIA QUAD) injection 0.5 mL  0.5 mL IntraMUSCular PRIOR TO DISCHARGE        Vitals:    03/02/21 1600 03/02/21 2025 03/03/21 0000 03/03/21 0400   BP: (!) 150/114 (!) 142/94 (!) 130/96 125/72   Pulse: (!) 107 (!) 104 98 94   Resp: 18 18 20 18   Temp: 98.1 °F (36.7 °C) 98 °F (36.7 °C) 98 °F (36.7 °C) 97.7 °F (36.5 °C)   SpO2: 100% 100% 98% 100%   Weight:       Height:         Objective:   General: alert awake well-oriented, no acute distress. HEENT: EOMI, no Icterus, no Pallor,  mucosa moist.  Neck: Neck is supple, No JVD  Lungs: breathsounds normal, no respiratory distress on inspection, no rhonchi, no rales,   CVS: heart sounds normal, regular rate and rhythm, no murmurs, no rubs. GI: soft, nontender, normal BS. Extremeties: no cyanosis, no edema,   Neuro: Alert, awake, oriented x3, No new focal deficits, moving all extremeties well. No resting tremors  Skin: normal skin turgor, no skin rashes. Intake and Output:  Current Shift: No intake/output data recorded. Last three shifts: 03/01 1901 - 03/03 0700  In: 600 [P.O.:600]  Out: -       Lab/Data Review:  No results found for this or any previous visit (from the past 24 hour(s)).        Signed By: Brown Vick MD     March 3, 2021

## 2021-03-03 NOTE — ROUTINE PROCESS
Verbal shift change report given to CICI Kaye RN (oncoming nurse) by ED Ross RN (offgoing nurse). Report included the following information SBAR, Kardex, Procedure Summary, Intake/Output, MAR, Recent Results and Quality Measures.

## 2021-03-03 NOTE — GROUP NOTE
[facility] GROUP DOCUMENTATION INDIVIDUAL Group Therapy Note Date: 3/3/2021 Group Start Time: 6018 Group End Time: 1400 Group Topic: Education Group - Inpatient SHF 3 DETOX Pratibha Gruber [facility] GROUP DOCUMENTATION GROUP Group Therapy Note Topic: Budget Attendees: 2 Attendance: Attended Patient's Goal: Identify lifestyle changes to support long term sobriety such as vocation, employment, education, and legal issues Interventions/techniques: Informed and Supported Follows Directions: Followed directions Interactions: Interacted appropriately Mental Status: Anxious Behavior/appearance: Cooperative and Neatly groomed Goals Achieved: Able to engage in interactions, Able to listen to others, Able to give feedback to another, Able to receive feedback, Identified triggers, Identified relapse prevention strategies and Identified resources and support systems Additional Notes: D: Patient was supported in creating a budget and discussing money management. In recovery, many addicts use up their entire life savings to fund their addiction. As soon as they receive cash, they spend it on drugs until they find their way into homelessness, poverty, and debt. Even those who dont find themselves in financial ruin begin to associate spending money with the rush of getting high. This can turn money into a powerful trigger or lead to spending on other things becoming a replacement addiction. For these reasons, money management is just as important as the [employer] people, places, and things to avoid  and it is critical to understand this fourth threat to sobriety. A: The patient withdrawal symptoms are still present as evidence by self-report and observation. He was able to complete the budget with support and identify how much money he was spending supporting his addiction. P: The medical staff will continue to monitor and assess the patients withdrawal symptoms. The patient will continue to participate in groups and activities. He is progressing towards his treatment goals. Fareed Taylor

## 2021-03-03 NOTE — PROGRESS NOTES
Problem: Alcohol Withdrawal  Goal: *STG: Complies with medication therapy  Outcome: Progressing Towards Goal     Patient is compliant with medication therapy. Taper was modified this morning per Dr. David Finch. Patient is progressing well with his withdrawal management. Vital signs have been stable and he has denied any acute changes in his mental status, though he is quite anxious today and has some tremors. CIWA score is 8.

## 2021-03-03 NOTE — ROUTINE PROCESS
Verbal shift change report given to CICI Quintanilla RN (oncoming nurse) by Kimberley Brittle. GAETANO Cintron (offgoing nurse). Report included the following information SBAR, Kardex, Intake/Output, MAR, Recent Results and Quality Measures.

## 2021-03-03 NOTE — GROUP NOTE
[facility] GROUP DOCUMENTATION INDIVIDUAL Group Therapy Note Date: 3/2/2021 Group Start Time: 1800 Group End Time: 1900 Group Topic: AA/NA 
 
SHF 3 DETOX Isidro Sessions [facility] GROUP DOCUMENTATION GROUP Group Therapy Note Attendees: 4 Attendance: Attended Patient's Goal: Interventions/techniques: Challenged, Informed, Validated, Promoted peer support, Provide feedback, Reinforced and Supported Follows Directions: Followed directions Interactions: Interacted appropriately Mental Status: Calm Behavior/appearance: Attentive and Cooperative Goals Achieved: Able to engage in interactions, Able to listen to others, Able to reflect/comment on own behavior, Able to receive feedback and Able to self-disclose Additional Notes:   
Shanna Bedoya was supported in reviewing and understanding the 12 Steps Program. He shared some of his personal story as to his addiction and what future treatment  he wants in the future for himself. The important support that AA/NA can give was reviewed. Derek Pacheco  is working toward the completion of his  treatment goals. Hiploito Bajwa was pleasant and cooperative during group. P- Staff is assisting Gutierrez with working toward the completion of his treatment goals. Adrienne Griffin

## 2021-03-03 NOTE — PROGRESS NOTES
Problem: Falls - Risk of  Goal: *Absence of Falls  Description: Document Allison Elsie Fall Risk and appropriate interventions in the flowsheet. Outcome: Progressing Towards Goal  Note: Fall Risk Interventions:            Medication Interventions: Teach patient to arise slowly                   Problem: Alcohol Withdrawal  Goal: *STG: Participates in treatment plan  Outcome: Progressing Towards Goal  Goal: *STG: Remains safe in hospital  Outcome: Progressing Towards Goal  Goal: *STG: Seeks staff when symptoms of withdrawal increase  Outcome: Progressing Towards Goal  Goal: *STG: Complies with medication therapy  Outcome: Progressing Towards Goal  Goal: *STG: Maintains appropriate nutrition and hydration  Outcome: Progressing Towards Goal  Goal: *STG: Vital signs within defined limits  Outcome: Progressing Towards Goal   Patient has been compliant thus far.

## 2021-03-03 NOTE — ROUTINE PROCESS
Bedside and Verbal shift change report given to GABRIEL Cintron RN (oncoming nurse) by Mariela Wolff. Gi Garcia RN (offgoing nurse). Report included the following information Intake/Output, MAR and Quality Measures.

## 2021-03-03 NOTE — PROGRESS NOTES
Peer Interactions Form         Contact/Interactions Made    Steven Terrance Wed Roselyn Graft Fri Sat Sun   Intake          NA/AA    X       One on One Interaction          Resources           Smoke Breaks   X             NOTES (observations/needs):   AA meeting with client, talked about the absolute importance of AA meeting and how they are what the program of recovery not only recommends but what helps lots of Alcoholics recover and stay recovered. I shared that not only do the meeting help, but you can find those who are just like you. Client was engaged and seemed to be interested in the meeting.

## 2021-03-04 VITALS
BODY MASS INDEX: 23.85 KG/M2 | DIASTOLIC BLOOD PRESSURE: 74 MMHG | TEMPERATURE: 97.7 F | HEIGHT: 69 IN | RESPIRATION RATE: 16 BRPM | SYSTOLIC BLOOD PRESSURE: 127 MMHG | WEIGHT: 161 LBS | HEART RATE: 98 BPM | OXYGEN SATURATION: 99 %

## 2021-03-04 PROCEDURE — 99239 HOSP IP/OBS DSCHRG MGMT >30: CPT | Performed by: INTERNAL MEDICINE

## 2021-03-04 PROCEDURE — 74011250637 HC RX REV CODE- 250/637: Performed by: INTERNAL MEDICINE

## 2021-03-04 RX ADMIN — SUCRALFATE 1 G: 1 TABLET ORAL at 06:34

## 2021-03-04 RX ADMIN — ANTACID TABLETS 200 MG: 500 TABLET, CHEWABLE ORAL at 06:36

## 2021-03-04 RX ADMIN — HYDROCHLOROTHIAZIDE 25 MG: 25 TABLET ORAL at 09:06

## 2021-03-04 RX ADMIN — BUSPIRONE HYDROCHLORIDE 10 MG: 5 TABLET ORAL at 09:06

## 2021-03-04 RX ADMIN — IBUPROFEN 400 MG: 400 TABLET, FILM COATED ORAL at 06:44

## 2021-03-04 RX ADMIN — LORAZEPAM 1 MG: 1 TABLET ORAL at 09:06

## 2021-03-04 RX ADMIN — BUPROPION HYDROCHLORIDE 100 MG: 100 TABLET, EXTENDED RELEASE ORAL at 09:06

## 2021-03-04 RX ADMIN — FOLIC ACID 1 MG: 1 TABLET ORAL at 09:06

## 2021-03-04 RX ADMIN — THERA TABS 1 TABLET: TAB at 09:06

## 2021-03-04 RX ADMIN — LOSARTAN POTASSIUM 100 MG: 25 TABLET, FILM COATED ORAL at 09:06

## 2021-03-04 RX ADMIN — Medication 400 MG: at 09:06

## 2021-03-04 RX ADMIN — PANTOPRAZOLE SODIUM 40 MG: 40 TABLET, DELAYED RELEASE ORAL at 06:34

## 2021-03-04 NOTE — PROGRESS NOTES
Patient presents in clean clothes, hygiene is appropriate. Patient denies SI/HI. Denies any hallucinations. Mood is anxious/somewhat irritable. Patient has frequent requests of staff. He gets irritated that he cannot just get things from his belonging such as tums, cough drops, etc. Staff explained we need orders for these items. Patient has not been a management problem though. He is attending and actively participating in treatment. He is compliant with medications. Pulse remains somewhat elevated. 114 was last reading. Blood pressure has improved since yesterday. Last CIWA score was 8. Appetite is improving. Sleep is getting better. Patient is currently resting quietly in bed with eyes closed. Will continue to monitor for safety and behavior. Will continue to monitor withdrawal symptoms and offer therapeutic support.

## 2021-03-04 NOTE — GROUP NOTE
[facility] GROUP DOCUMENTATION INDIVIDUAL Group Therapy Note Date: 3/4/2021 Group Start Time: 0152 Group End Time: 1055 Group Topic: Relapse Prevention/Role Play Group SHF 3 DETOX Raffy Achilles [facility] GROUP DOCUMENTATION GROUP Group Therapy Note Topic: Relapse vs. Lapse Attendees: 4 Attendance: Attended Patient's Goal:  Relapse prevention Interventions/techniques: Informed and Supported Follows Directions: Followed directions Interactions: Interacted appropriately Mental Status: Anxious Behavior/appearance: Attentive and Cooperative Goals Achieved: Able to engage in interactions, Able to listen to others, Identified relapse prevention strategies and Identified resources and support systems Additional Notes:   
D: The patient was supported in a group discussion on the difference between lapse and relapse. There is a major difference between having one slip and having a relapse. A lapse represents a temporary slip or return to a previous behavior that one is trying to control or quit (usually a onetime occurrence), whereas a relapse represents a full-blown return to a pattern of behavior that one has been trying to moderate or quit altogether. A: The patient withdrawal symptoms are still present as evidence by self-report and observation. The patient was able to verbalize an understanding of the difference. P: The medical staff will continue to monitor and assess the patients withdrawal symptoms. Patient will continue with medication management and attend groups. Wilfred Vicente

## 2021-03-04 NOTE — PROGRESS NOTES
Problem: Alcohol Withdrawal  Goal: *STG: Participates in treatment plan  Outcome: Resolved/Met   Patient will discharge today and has verbalized abstinence as an achievable goal. He has also agreed to participate in outpatient after care program to support his ongoing mental health. He will follow up with [facility]. He was able to identify lifestyle changes to support his loong term sobriety.

## 2021-03-04 NOTE — BH NOTES
Chemical Dependency/Substance Abuse Therapist/Counselor Documentation    /THERAPIST PROGRESS NOTE    CURRENT STATUS OF PATIENT: Orientation (check one) [x] Person [x] Place [x] Time [x] Purpose    (Patient voiced concerns: No concerns were voice.)    Attitude/Behavior toward Examiner:   [x] Appropriate [] Cooperative [] Aggressive [] Argumentative [] Angry [] Apathetic [] Passive  [] Childlike [] Interactive [] Guarded [] Demanding [] Dramatic [] Evasive [] Hostile [] Irritable [] Manipulative   [] Uncooperative [] Difficult to redirect [] Isolative-Withdrawal [] Sad-Tearful [] Suspicious [] Defensive    TREATMENT PLAN PROBLEM BEING ADDRESS:Medical Detox and Relapse Prevention    MODALITY:  [x] Individual Therapy  [] Group Therapy  [] Family Therapy with Client Present  [] Family Therapy without Client Present  [] Multi-Family Group Therapy  [x] Spirituality Psychotherapy Group  [x] Psycho-Edutherapy Group    RISK OF HARM:    [x] None identified    [] Self injurious behavior  [] Threatening others without a plan   [] Actively Suicidal with plan  [] Threatening others with plan  [] Suicidal Thoughts without plan [] Weapon in the home  [] Means to complete a plan    Comments:Participated in community and Spirituality Group, individual sessions, discharge planning, participated in morning groups and evening groups.       General Appearance: [x] Normal [] Disheveled [] Emaciated [] Obese [] Poor Hygiene    Dress: [x] Appropriate [] Eccentric [] Seductive [] Bizarre    Mood: [x] Euthymic/normal [] Dysphoric/unease [] Anxious [] Agitated [] Dysthymic [] Depressed [] Euphoric [] Pleasant  [] Bright [] Angry [] Manic    Affect: [x] Appropriate [] Inappropriate [] Labile [] Constricted [] Blunted [] Flat [] Lethargic [] Preoccupied    THOUGHT CONTENT:   [x] Remains focused on topic/thought control [] Unable to remain focused on topic [] Blocking [] Disorganized  [] Confused [] Preoccupied [] Flight of ideas [] Tangential [] Delusional thought content [] Persecutory  [] Bizarre [] Grandeur [] Guilt [] Somatic     PERCEPTION: [x] Normal [] Hallucinations [] Auditory [] Visual [] Tactile [] Olfactory/smell [] Gustatory/taste    Briefly summarize the specifics of the identified symptoms and/or behaviors listed and progress towards improvement and/or group interactions: Patient is active in group and pleasant in his interactions. MET (Motivational Enhancement Therapy): Based on discussions and interactions with the patient the patient falls within the following stage of treatment:   [] Pre-contemplation: Denial of illness with no need to change  [x] Contemplation: Awareness of problem and considering change  [] Determination: Willing to change and open to possible change  [] Action: Actively involved in recovery/treatment demonstrates a willing to make plans to change  [] Relapse Prevention: Able to verbalize plans to prevent relapse, 7 day plan    Briefly summarize the patient's interactions/discussions indicating the current or change in level of therapeutic treatment: Patient is actively expressing wanting to learn how to stay in recovery. TREATMENT PROVIDED:  [] Developed/Reviewed Crisis Prevention Plan  [] Discussed/Reviewed Treatment Goals on Treatment Plan  [x] Discussed/Reviewed Discharge Planning   [] Discussed/Reviewed the patient goals of treatment  [] Refused/Unable to participate in discharge planning [] Other:   RECOMMENDATIONS: [] Change current therapeutic focus  [] Change treatment goals/objectives on the treatment plan - No change to treatment plan recommended.     CONTINUED PLAN OF CARE:  -  Continue with current Plan of Care  DISCHARGE PLANNING:   [x] Has identified a family support system   [] Patient has not identified a family support system  [x] Has connected with sober/clean support system  [] Patient has not connected with sober/clean support system  [] Patient uses special equipment at home and equipment is in the home  [] Has identified community support    [] Patient has not been able to identify community support  Barriers to Discharge:  [] Difficulty returning to present living arrangement  [] Will require assistance with placement post discharge  [] There is no local substance abuse disorder programs available to the patient  [] Needs referral to     Discharge Planning Comments:  Rachelle Lechuga states he will be discharged on Friday, March 5th. From Detox. He reports that he will be seeking an appointment with [facility] for [facility] outpatient treatment.

## 2021-03-04 NOTE — PROGRESS NOTES
Problem: Falls - Risk of  Goal: *Absence of Falls  Description: Document Speedy Godinez Fall Risk and appropriate interventions in the flowsheet. Outcome: Progressing Towards Goal  Note: Fall Risk Interventions:            Medication Interventions: Teach patient to arise slowly        Problem: Alcohol Withdrawal  Goal: *STG: Participates in treatment plan  Outcome: Progressing Towards Goal  Goal: *STG: Remains safe in hospital  Outcome: Progressing Towards Goal  Goal: *STG: Seeks staff when symptoms of withdrawal increase  Outcome: Progressing Towards Goal  Goal: *STG: Complies with medication therapy  Outcome: Progressing Towards Goal  Goal: *STG: Attends activities and groups  Outcome: Progressing Towards Goal   Patient appears compliant thus far.

## 2021-03-04 NOTE — GROUP NOTE
[facility] GROUP DOCUMENTATION INDIVIDUAL Group Therapy Note Date: 3/3/2021 Group Start Time: 1800 Group End Time: 1900 Group Topic: AA/NA 
 
SHF 3 DETOX Connie Olivas,  Robinubaldoflakito Florence [facility] GROUP DOCUMENTATION GROUP Group Therapy Note Attendees: 4 Attendance: Attended Patient's Goal: Attends activities and groups,  Verbalizes abstinence as an achievable goal 
 
Interventions/techniques: Challenged, Informed, Promoted peer support, Provide feedback, Reinforced and Supported Follows Directions: Followed directions Interactions: Interacted appropriately Mental Status: Calm Behavior/appearance: Attentive and Cooperative Goals Achieved: Able to engage in interactions, Able to listen to others, Able to give feedback to another, Able to reflect/comment on own behavior, Able to receive feedback, Able to self-disclose and Discussed discharge plans Additional Notes:   
Candy Tanner was supported in reviewing and understanding the 12 Steps Program.  The important support that AA/NA can give was reviewed. Liana Wiggins  is working toward the completion of his  treatment goals. Emilie Darling was pleasant and cooperative during group. P- Staff is assisting Gutierrez with working toward the completion of his treatment goals. Korey Morris

## 2021-03-04 NOTE — PROGRESS NOTES
Brenna Acuña received written and oral discharge instructions and he verbalized understanding. He was not given any medications. Brenna Acuña is to follow-up with his PCP to discuss diagnosis of Alcohol Use Disorder . He was given literature on preventing blood clots and on smoking cessation. No complaints voiced. All belongings were returned to Brenna Domenico.

## 2021-03-04 NOTE — ROUTINE PROCESS
Shift change report given to Dean Cobos RN by Rashid Nicholson RN). Report included the following information Kardex, Intake/Output, MAR and Recent Results.

## 2021-03-04 NOTE — PROGRESS NOTES
Patient was given his morning medications which he took without issue. Stated that his neck was really hurting from \"sleeping on it wrong. \"  Patient was given 400 mg ibuprofen po. He tolerated well. Patient also complained \" I did not sleep at all\"  However he was noted to be snoring by staff multiple times throughout the night. Patient appears overall very irritable, and has multiple complaints. Appears very focused on discharge. Informed patient this writer would notify the doctor about  His sleep issues to see if any medication adjustments can be made. He is resting quietly in bed watching television. Will continue to monitor for safety and well being.

## 2021-03-04 NOTE — ROUTINE PROCESS
Bedside and Verbal shift change report given to GABRIEL Cintron RN (oncoming nurse) by Teddy Donohue RN (offgoing nurse). Report included the following information MAR, Recent Results and Quality Measures.

## 2021-03-04 NOTE — DISCHARGE SUMMARY
Discharge Summary     Patient: Anyi Hoang MRN: [id number]  SSN: [id number]    YOB: 1970  Age: 49 y.o. Sex: male       Admit Date: 3/1/2021    Discharge Date: 3/4/2021      Admission Diagnoses: Alcohol withdrawal ([facility].) [F10.239]    Discharge Diagnoses:   Problem List as of 3/4/2021 Never Reviewed          Codes Class Noted - Resolved    Alcohol withdrawal ([facility].) ICD-10-CM: Y04.341  ICD-9-CM: 291.81  3/1/2021 - Present               Discharge Condition: Good    Hospital Course: This is a pleasant 60-year-old gentleman who presented in acute alcohol withdrawal.  The patient has a longstanding history of alcohol use. His initial CIWA score on presentation was approximately 11. He was started on Ativan taper and did extremely well. The original plan was to discharge him Friday but on Thursday he showed interest in being discharged and at this juncture I see no reason why he cannot go. He has been continued on his outpatient medications he will be following up at [place] [id number]    Consults: None    Significant Diagnostic Studies: labs:   Results for Lore Ware \"JACQUI\" (MRN [id number]) as of 3/4/2021 07:28   Ref. Range 3/1/2021 14:45   WBC Latest Ref Range: 4.6 - 13.2 K/uL 7.9   RBC Latest Ref Range: 4.70 - 5.50 M/uL 4.30 (L)   HGB Latest Ref Range: 13.0 - 16.0 g/dL 13.5   HCT Latest Ref Range: 36.0 - 48.0 % 39.8   MCV Latest Ref Range: 74.0 - 97.0 FL 92.6   MCH Latest Ref Range: 24.0 - 34.0 PG 31.4   MCHC Latest Ref Range: 31.0 - 37.0 g/dL 33.9   RDW Latest Ref Range: 11.6 - 14.5 % 11.9   PLATELET Latest Ref Range: 135 - 420 K/uL 414   MPV Latest Ref Range: 9.2 - 11.8 FL 8.3 (L)   NEUTROPHILS Latest Ref Range: 40 - 73 % 71   LYMPHOCYTES Latest Ref Range: 21 - 52 % 21   MONOCYTES Latest Ref Range: 3 - 10 % 8   EOSINOPHILS Latest Ref Range: 0 - 5 % 0   BASOPHILS Latest Ref Range: 0 - 2 % 0   IMMATURE GRANULOCYTES Latest Units: % 0   ABS.  NEUTROPHILS Latest Ref Range: 1.8 - 8.0 K/UL 5.6 ABS. IMM. GRANS. Latest Units: K/UL 0.0   ABS. LYMPHOCYTES Latest Ref Range: 0.9 - 3.6 K/UL 1.6   ABS. MONOCYTES Latest Ref Range: 0.05 - 1.2 K/UL 0.6   ABS. EOSINOPHILS Latest Ref Range: 0.0 - 0.4 K/UL 0.0   ABS. BASOPHILS Latest Ref Range: 0.0 - 0.1 K/UL 0.0   Color Latest Units:   Yellow/Straw   Appearance Latest Ref Range: Clear   Clear   Specific gravity Latest Ref Range: 1.003 - 1.035   1.015   pH (UA) Latest Ref Range: 5.0 - 9.0   6.0   Protein Latest Ref Range: Negative mg/dL Negative   Glucose Latest Ref Range: Negative mg/dL Negative   Ketone Latest Ref Range: Negative mg/dL Negative   Blood Latest Ref Range: Negative   Negative   Bilirubin Latest Ref Range: Negative   Negative   Urobilinogen Latest Ref Range: 0.2 - 1.0 EU/dL 0.2   Nitrites Latest Ref Range: Negative   Negative   Leukocyte Esterase Latest Ref Range: Negative   Negative   Sodium Latest Ref Range: 135 - 145 mmol/L 137   Potassium Latest Ref Range: 3.2 - 5.1 mmol/L 3.7   Chloride Latest Ref Range: 94 - 111 mmol/L 105   CO2 Latest Ref Range: 21 - 33 mmol/L 23   Anion gap Latest Units: mmol/L 9   Glucose Latest Ref Range: 70 - 110 mg/dL 96   BUN Latest Ref Range: 9 - 21 mg/dL 13   Creatinine Latest Ref Range: 0.8 - 1.50 mg/dL 0.70 (L)   BUN/Creatinine ratio Latest Units:   19   Calcium Latest Ref Range: 8.5 - 10.5 mg/dL 8.8   GFR est non-AA Latest Units: ml/min/1.73m2 >60   GFR est AA Latest Units: ml/min/1.73m2 >60   Bilirubin, total Latest Ref Range: 0.2 - 1.0 mg/dL 0.3   Protein, total Latest Ref Range: 6.1 - 8.4 g/dL 7.1   Albumin Latest Ref Range: 3.5 - 4.7 g/dL 3.8   Globulin Latest Units: g/dL 3.3   A-G Ratio Latest Units:   1.2   ALT Latest Ref Range: 3 - 72 U/L 36   AST Latest Ref Range: 17 - 74 U/L 59   Alk.  phosphatase Latest Ref Range: 38 - 126 U/L 60   Vitamin B12 Latest Ref Range: 193 - 986 pg/mL 312   Folate Latest Ref Range: 5.0 - 21.0 ng/mL 5.1   ALCOHOL(ETHYL),SERUM Latest Ref Range: <4 mg/dL 170 (H)   Ethanol, percent Latest Ref Range: <0.004 % 0.165 (H)   AMPHETAMINES Latest Ref Range: Negative   Negative   BARBITURATES Latest Ref Range: Negative   Negative   BENZODIAZEPINES Latest Ref Range: Negative   Negative   COCAINE Latest Ref Range: Negative   Negative   METHADONE Latest Ref Range: Negative   Negative   OPIATES Latest Ref Range: Negative   Negative   OXYCODONE SCREEN Latest Ref Range: Negative   Negative   PCP(PHENCYCLIDINE) Latest Ref Range: Negative   Negative   PROPOXYPHENE Latest Ref Range: Negative   Negative   THC (TH-CANNABINOL) Latest Ref Range: Negative   Negative   TRICYCLICS Latest Ref Range: Negative   Negative   DRUG SCREEN, URINE Unknown Rpt   RPR Latest Ref Range: NONREACTIVE  NONREACTIVE       Disposition: home    Discharge Medications:   Current Discharge Medication List      CONTINUE these medications which have NOT CHANGED    Details   valsartan-hydroCHLOROthiazide (DIOVAN-HCT) 320-25 mg per tablet Take 1 Tab by mouth daily. busPIRone (BUSPAR) 10 mg tablet Take 10 mg by mouth two (2) times a day. buPROPion SR (WELLBUTRIN SR) 100 mg SR tablet Take 100 mg by mouth two (2) times a day. magnesium oxide 500 mg tab Take 500 mg by mouth daily. STOP taking these medications       omeprazole (PRILOSEC) 40 mg capsule Comments:   Reason for Stopping:         multivitamin (Daily Multiple) tablet Comments:   Reason for Stopping:               Activity: Activity as tolerated  Diet: Regular Diet  Wound Care: None needed    Follow-up Appointments   Procedures    FOLLOW UP VISIT Appointment in: 3 - 5 Days He is going to [employer]. He is going to [employer].      Standing Status:   Standing     Number of Occurrences:   1     Order Specific Question:   Appointment in     Answer:   3 - 5 Days     Visit Vitals  /75 (BP 1 Location: Right upper arm, BP Patient Position: Sitting)   Pulse (!) 114   Temp 97.8 °F (36.6 °C)   Resp 18   Ht 5' 9\" (1.753 m)   Wt 161 lb (73 kg)   SpO2 100%   BMI 23.78 kg/m²   gen: nad, pleasant  Heent: mmm  Cv: slightly tachy  Resp: ctb  Signed By: Yumiko Mckeon MD     March 4, 2021    Total time spent was greater than 30 minutes

## 2021-03-04 NOTE — DISCHARGE INSTRUCTIONS
Patient Education        Alcohol, Drug, or Poison Ingestion: Care Instructions  Your Care Instructions     A person can become very sick, or die, from swallowing or using alcohol, drugs, or poisons. Alcohol poisoning occurs when a person drinks a large amount of alcohol. Alcohol can stop nerve signals that control breathing. It can also stop the gag reflex that prevents choking. Alcohol poisoning is serious. It can lead to brain damage or death if it's not treated right away. Drugs can be used by accident or on purpose. They can be swallowed, inhaled, injected, or absorbed through the skin. Drugs include over-the-counter medicine (such as aspirin or acetaminophen) and prescription medicine. They also include vitamins and supplements. And they include illegal drugs such as cocaine and heroin. And poisons are all around us. They include household , cosmetics, houseplants, and garden chemicals. The doctor has checked you carefully, but problems can develop later. If you notice any problems or new symptoms, get medical treatment right away. Follow-up care is a key part of your treatment and safety. Be sure to make and go to all appointments, and call your doctor if you are having problems. It's also a good idea to know your test results and keep a list of the medicines you take. How can you care for yourself at home? Alcohol problems  · Talk to your doctor or counselor about programs that can help you stop using alcohol. · Plan ways to avoid being tempted to drink. ? Get rid of all alcohol in your home. ? Avoid places where you tend to drink. ? Stay away from places or events that offer alcohol. ? Stay away from people who drink a lot. Drug problems  · Talk to your doctor about programs that can help you stop using drugs. · Get rid of any drugs you might be tempted to misuse. · Learn how to say no when other people use drugs. · Don't spend time with people who use drugs.   Poison prevention  · Keep products in the containers they came in. Keep them with the original labels. · Be careful when you use cleaning products, paints, solvents, and pesticides. Read labels before use. Use a fan to move strong odors and fumes out of your home. · Do not mix cleaning products. Try to use nontoxic . These include vinegar, lemon juice, and baking soda. When should you call for help? Poison control centers, hospitals, or your doctor can give immediate advice in the case of a poisoning. The [employer] number is [phone]. Have the poison container with you so you can give complete information to the poison control center, such as what the poison or substance is, how much was taken and when. Do not try to make the person vomit. Call 911 anytime you think you may need emergency care. For example, call if you or someone else:    · Has used or currently uses alcohol or drugs and is very confused or can't stay awake.     · Has passed out (lost consciousness).     · Has severe trouble breathing.     · Is having a seizure. Call your doctor now or seek immediate medical care if you or someone else:    · Has new symptoms, or is not acting normally. Watch closely for changes in your health, and be sure to contact your doctor if:    · You do not get better as expected.     · You need help with drug or alcohol problems.     · You have problems with depression or other mental health issues. Where can you learn more? Go to http://www.gray.com/  Enter A385 in the search box to learn more about \"Alcohol, Drug, or Poison Ingestion: Care Instructions. \"  Current as of: June 26, 2019               Content Version: 12.6  © 7453-7516 [employer]. Care instructions adapted under license by [employer] (which disclaims liability or warranty for this information).  If you have questions about a medical condition or this instruction, always ask your healthcare professional. [address] any warranty or liability for your use of this information. Patient Education        Alcohol Detoxification and Withdrawal: Care Instructions  Your Care Instructions     If you drink alcohol regularly and then suddenly stop, you may go through some physical and emotional problems while the alcohol clears out of your system. Clearing the alcohol from your body is called detoxification, or detox. Physical and emotional problems that may happen during detox are called withdrawal.  Symptoms of withdrawal can be scary and dangerous. Mild symptoms include nausea and vomiting, sweating, shakiness, and intense worry. Severe symptoms include being confused and irritable, feeling things on your body that are not there, seeing or hearing things that are not there, and trembling. You may even have seizures. If your symptoms become severe you must see a doctor. People who drink large amounts of alcohol should not try to detox at home. A person can die of severe alcohol withdrawal.  Symptoms of alcohol withdrawal may begin from 4 to 12 hours after you stop drinking. But they may not start for several days after the last drink. They can last a few days. It is hard to stop drinking. But when you have cleared the alcohol from your system, you will be able to start the next part of your life, free from the burden of being dependent. Follow-up care is a key part of your treatment and safety. Be sure to make and go to all appointments, and call your doctor if you are having problems. It's also a good idea to know your test results and keep a list of the medicines you take. How can you care for yourself at home? · Before you stop drinking, talk to your doctor about how you plan to stop. Be sure to be completely honest with him or her about how much you have been drinking.  Your doctor will figure out whether you need to detox in a [facility] medical center. · Take your medicines exactly as prescribed. Call your doctor if you think you are having a problem with your medicine. · Make sure someone you trust is with you the whole time. Have friends and family members take turns staying with you until you are done with detox. · Put a list of emergency numbers near the phone. This should include your doctor, the police, the nearest hospital and emergency room, and neighbors who can help if needed. · Make sure all alcohol is removed from the house before you start. This includes beverages as well as medicines, rubbing alcohol, and certain flavorings like vanilla extract. · Keep \"drinking buddies\" away during the time you are going through detox. · Make your surroundings calm. Soft lights, soft music, and a comfortable place to sit or lie down can help make the process easier. · Drink lots of fluids and eat snacks such as fruit, cheese and crackers, and pretzels. Foods high in carbohydrate may help reduce the craving for alcohol. · Understand that detox is going to be hard. · Keep in mind that the people watching over you during detox are there to help. Explain to them before you start that you may not act like yourself until detox is finished. · Consider joining a support group such as Alcoholics Anonymous. Sharing your experiences with other people who face similar challenges may help you feel less overwhelmed. · Keep the numbers for these national suicide hotlines: 2-566-330-TALK (4-754.947.1964) and 8-378-ATBLVUG (2-450.449.8465). If you or someone you know talks about suicide or feeling hopeless, get help right away. When should you call for help? Call 911 anytime you think you may need emergency care.  For example, call if:    · You feel you cannot stop from hurting yourself or someone else.     · You vomit many times and cannot stop.     · You vomit blood or what looks like coffee grounds.     · You have trouble breathing or are breathing very fast.     · Your heart beats more than 120 times a minute and will not slow down.     · You have chest pain.     · You have a seizure.     · You see or feel things that are not there (hallucinate). If you are caring for someone who is going through detox, call if:    · The person passes out (loses consciousness).     · The person sees or feels things that are not there and sees or hears the same things many times.     · The person is very agitated and will not calm down.     · The person becomes violent or threatens to be violent.     · The person has a seizure. Call your doctor now or seek immediate medical care if:    · You have a high fever.     · You have severe belly pain.     · You are very shaky. Watch closely for changes in your health, and be sure to contact your doctor if:    · You do not get better as expected. Where can you learn more? Go to http://www.holden.com/  Enter D051 in the search box to learn more about \"Alcohol Detoxification and Withdrawal: Care Instructions. \"  Current as of: June 29, 2020               Content Version: 12.6  © 8897-3853 [employer]. Care instructions adapted under license by [employer] (which disclaims liability or warranty for this information). If you have questions about a medical condition or this instruction, always ask your healthcare professional. [address] any warranty or liability for your use of this information. Patient Education        Alcohol Detoxification and Withdrawal: Care Instructions  Your Care Instructions     If you drink alcohol regularly and then suddenly stop, you may go through some physical and emotional problems while the alcohol clears out of your system. Clearing the alcohol from your body is called detoxification, or detox.  Physical and emotional problems that may happen during detox are called withdrawal.  Symptoms of withdrawal can be scary and dangerous. Mild symptoms include nausea and vomiting, sweating, shakiness, and intense worry. Severe symptoms include being confused and irritable, feeling things on your body that are not there, seeing or hearing things that are not there, and trembling. You may even have seizures. If your symptoms become severe you must see a doctor. People who drink large amounts of alcohol should not try to detox at home. A person can die of severe alcohol withdrawal.  Symptoms of alcohol withdrawal may begin from 4 to 12 hours after you stop drinking. But they may not start for several days after the last drink. They can last a few days. It is hard to stop drinking. But when you have cleared the alcohol from your system, you will be able to start the next part of your life, free from the burden of being dependent. Follow-up care is a key part of your treatment and safety. Be sure to make and go to all appointments, and call your doctor if you are having problems. It's also a good idea to know your test results and keep a list of the medicines you take. How can you care for yourself at home? · Before you stop drinking, talk to your doctor about how you plan to stop. Be sure to be completely honest with him or her about how much you have been drinking. Your doctor will figure out whether you need to detox in a supervised medical center. · Take your medicines exactly as prescribed. Call your doctor if you think you are having a problem with your medicine. · Make sure someone you trust is with you the whole time. Have friends and family members take turns staying with you until you are done with detox. · Put a list of emergency numbers near the phone. This should include your doctor, the police, the nearest hospital and emergency room, and neighbors who can help if needed. · Make sure all alcohol is removed from the house before you start.  This includes beverages as well as medicines, rubbing alcohol, and certain flavorings like vanilla extract. · Keep \"drinking buddies\" away during the time you are going through detox. · Make your surroundings calm. Soft lights, soft music, and a comfortable place to sit or lie down can help make the process easier. · Drink lots of fluids and eat snacks such as fruit, cheese and crackers, and pretzels. Foods high in carbohydrate may help reduce the craving for alcohol. · Understand that detox is going to be hard. · Keep in mind that the people watching over you during detox are there to help. Explain to them before you start that you may not act like yourself until detox is finished. · Consider joining a support group such as Alcoholics Anonymous. Sharing your experiences with other people who face similar challenges may help you feel less overwhelmed. · Keep the numbers for these national suicide hotlines: 3-276-942-TALK (1-208.246.1395) and 8-087-SROBMYE (2-591.593.3438). If you or someone you know talks about suicide or feeling hopeless, get help right away. When should you call for help? Call 911 anytime you think you may need emergency care. For example, call if:    · You feel you cannot stop from hurting yourself or someone else.     · You vomit many times and cannot stop.     · You vomit blood or what looks like coffee grounds.     · You have trouble breathing or are breathing very fast.     · Your heart beats more than 120 times a minute and will not slow down.     · You have chest pain.     · You have a seizure.     · You see or feel things that are not there (hallucinate). If you are caring for someone who is going through detox, call if:    · The person passes out (loses consciousness).     · The person sees or feels things that are not there and sees or hears the same things many times.     · The person is very agitated and will not calm down.     · The person becomes violent or threatens to be violent.     · The person has a seizure.    Call your doctor now or seek immediate medical care if:    · You have a high fever.     · You have severe belly pain.     · You are very shaky. Watch closely for changes in your health, and be sure to contact your doctor if:    · You do not get better as expected. Where can you learn more? Go to http://www.holden.com/  Enter D051 in the search box to learn more about \"Alcohol Detoxification and Withdrawal: Care Instructions. \"  Current as of: June 29, 2020               Content Version: 12.6  © 5525-4903 [employer]. Care instructions adapted under license by [employer] (which disclaims liability or warranty for this information). If you have questions about a medical condition or this instruction, always ask your healthcare professional. [address] any warranty or liability for your use of this information. Patient Education        Alcohol Detoxification and Withdrawal: Care Instructions  Your Care Instructions     If you drink alcohol regularly and then suddenly stop, you may go through some physical and emotional problems while the alcohol clears out of your system. Clearing the alcohol from your body is called detoxification, or detox. Physical and emotional problems that may happen during detox are called withdrawal.  Symptoms of withdrawal can be scary and dangerous. Mild symptoms include nausea and vomiting, sweating, shakiness, and intense worry. Severe symptoms include being confused and irritable, feeling things on your body that are not there, seeing or hearing things that are not there, and trembling. You may even have seizures. If your symptoms become severe you must see a doctor. People who drink large amounts of alcohol should not try to detox at home. A person can die of severe alcohol withdrawal.  Symptoms of alcohol withdrawal may begin from 4 to 12 hours after you stop drinking. But they may not start for several days after the last drink. They can last a few days. It is hard to stop drinking. But when you have cleared the alcohol from your system, you will be able to start the next part of your life, free from the burden of being dependent. Follow-up care is a key part of your treatment and safety. Be sure to make and go to all appointments, and call your doctor if you are having problems. It's also a good idea to know your test results and keep a list of the medicines you take. How can you care for yourself at home? · Before you stop drinking, talk to your doctor about how you plan to stop. Be sure to be completely honest with him or her about how much you have been drinking. Your doctor will figure out whether you need to detox in a supervised medical center. · Take your medicines exactly as prescribed. Call your doctor if you think you are having a problem with your medicine. · Make sure someone you trust is with you the whole time. Have friends and family members take turns staying with you until you are done with detox. · Put a list of emergency numbers near the phone. This should include your doctor, the police, the nearest hospital and emergency room, and neighbors who can help if needed. · Make sure all alcohol is removed from the house before you start. This includes beverages as well as medicines, rubbing alcohol, and certain flavorings like vanilla extract. · Keep \"drinking buddies\" away during the time you are going through detox. · Make your surroundings calm. Soft lights, soft music, and a comfortable place to sit or lie down can help make the process easier. · Drink lots of fluids and eat snacks such as fruit, cheese and crackers, and pretzels. Foods high in carbohydrate may help reduce the craving for alcohol. · Understand that detox is going to be hard. · Keep in mind that the people watching over you during detox are there to help.  Explain to them before you start that you may not act like yourself until detox is finished. · Consider joining a support group such as Alcoholics Anonymous. Sharing your experiences with other people who face similar challenges may help you feel less overwhelmed. · Keep the numbers for these national suicide hotlines: 3-029-240-TALK (6-256.817.1416) and 6-770-TZGFGOZ (8-580.165.8491). If you or someone you know talks about suicide or feeling hopeless, get help right away. When should you call for help? Call 911 anytime you think you may need emergency care. For example, call if:    · You feel you cannot stop from hurting yourself or someone else.     · You vomit many times and cannot stop.     · You vomit blood or what looks like coffee grounds.     · You have trouble breathing or are breathing very fast.     · Your heart beats more than 120 times a minute and will not slow down.     · You have chest pain.     · You have a seizure.     · You see or feel things that are not there (hallucinate). If you are caring for someone who is going through detox, call if:    · The person passes out (loses consciousness).     · The person sees or feels things that are not there and sees or hears the same things many times.     · The person is very agitated and will not calm down.     · The person becomes violent or threatens to be violent.     · The person has a seizure. Call your doctor now or seek immediate medical care if:    · You have a high fever.     · You have severe belly pain.     · You are very shaky. Watch closely for changes in your health, and be sure to contact your doctor if:    · You do not get better as expected. Where can you learn more? Go to http://www.[employer].com/  Enter D051 in the search box to learn more about \"Alcohol Detoxification and Withdrawal: Care Instructions. \"  Current as of: June 29, 2020               Content Version: 12.6  © 1892-5192 [employer].    Care instructions adapted under license by [employer] (which disclaims liability or warranty for this information). If you have questions about a medical condition or this instruction, always ask your healthcare professional. [place] [address] any warranty or liability for your use of this information. Patient Education        Learning About Alcohol Use Disorder  What is alcohol use disorder? Alcohol use disorder means that a person drinks alcohol even though it causes harm to themselves or others. It can range from mild to severe. The more signs of this disorder you have, the more severe it may be. Moderate to severe alcohol use disorder is sometimes called addiction. People who have it may find it hard to control their use of alcohol. People who have this disorder may argue with others about how much they're drinking. Their job may be affected because of drinking. They may drink when it's dangerous or illegal, such as when they drive. They also may have a strong need, or craving, to drink. They may feel like they must drink just to get by. Their drinking may increase their risk of getting hurt or being in a car crash. Over time, drinking too much alcohol may cause health problems. These may include high blood pressure, liver problems, or problems with digestion. What are the signs? Maybe you've wondered about your alcohol habits, or how to tell if your drinking is becoming a problem. Here are some of the signs of alcohol use disorder. You may have it if you have two or more of the following signs:  · You drink larger amounts of alcohol than you ever meant to. Or you've been drinking for a longer time than you ever meant to. · You can't cut down or control your use. Or you constantly wish you could cut down. · You spend a lot of time getting or drinking alcohol or recovering from its effects. · You have strong cravings for alcohol. · You can no longer do your main jobs at work, at school, or at home.   · You keep drinking alcohol, even though your use hurts your relationships. · You have stopped doing important activities because of your alcohol use. · You drink alcohol in situations where doing so is dangerous. · You keep drinking alcohol even though you know it's causing health problems. · You need more and more alcohol to get the same effect, or you get less effect from the same amount over time. This is called tolerance. · You have uncomfortable symptoms when you stop drinking alcohol or use less. This is called withdrawal.  Alcohol use disorder can range from mild to severe. The more signs you have, the more severe the disorder may be. Moderate to severe alcohol use disorder is sometimes called addiction. You might not realize that your drinking is a problem. You might not drink large amounts when you drink. Or you might go for days or weeks between drinking episodes. But even if you don't drink very often, your drinking could still be harmful and put you at risk. How is alcohol use disorder treated? Getting help is up to you. But you don't have to do it alone. There are many people and kinds of treatments that can help. Treatment for alcohol use disorder can include:  · Group therapy, one or more types of counseling, and alcohol education. · Medicines that help to:  ? Reduce withdrawal symptoms and help you safely stop drinking. ? Reduce cravings for alcohol. · Support groups. These groups include Alcoholics Anonymous and [facility] (Self-Management and Recovery Training). Some people are able to stop or cut back on drinking with help from a counselor. People who have moderate to severe alcohol use disorder may need medical treatment. They may need to stay in a hospital or treatment center. You may have a treatment team to help you. This team may include a psychologist or psychiatrist, counselors, doctors, social workers, nurses, and a . A  helps plan and manage your treatment.   Follow-up care is a key part of your treatment and safety. Be sure to make and go to all appointments, and call your doctor if you are having problems. It's also a good idea to know your test results and keep a list of the medicines you take. Where can you learn more? Go to http://www.gray.com/  Enter H758 in the search box to learn more about \"Learning About Alcohol Use Disorder. \"  Current as of: June 29, 2020               Content Version: 12.6  © 2006-2020 [employer]. Care instructions adapted under license by [employer] (which disclaims liability or warranty for this information). If you have questions about a medical condition or this instruction, always ask your healthcare professional. [address] any warranty or liability for your use of this information.

## 2021-03-04 NOTE — GROUP NOTE
[facility] GROUP DOCUMENTATION INDIVIDUAL Group Therapy Note Date: 3/4/2021 Group Start Time: 3034 Group End Time: 1000 Group Topic: [employer] [facility] 3 DETOX Raza Levy [facility] GROUP DOCUMENTATION GROUP Group Therapy Note Topic: Community Attendees: 4 Attendance: Attended Patient's Goal:  Attends groups and activities Interventions/techniques: Informed and Supported Follows Directions: Followed directions Interactions: Interacted appropriately Mental Status: Anxious Behavior/appearance: Attentive, Cooperative and Neatly groomed Goals Achieved: Able to engage in interactions, Able to listen to others, Able to give feedback to another, Identified relapse prevention strategies and Identified resources and support systems Additional Notes:   
 
D:  The counselor discussed the daily schedule, reviewed program rules and regulations with the patient. Patient did not report any concerns or issues. A:  Patient is scheduled to be discharged today. He presented with some degree of anxiousness because of his discharge P:  Patient will be discharged to discharged home today. Zeinab Lagos

## 2022-03-18 PROBLEM — F10.939 ALCOHOL WITHDRAWAL (HCC): Status: ACTIVE | Noted: 2021-03-01

## 2023-02-15 RX ORDER — BUPROPION HYDROCHLORIDE 100 MG/1
100 TABLET, EXTENDED RELEASE ORAL 2 TIMES DAILY
COMMUNITY

## 2023-02-15 RX ORDER — BUSPIRONE HYDROCHLORIDE 10 MG/1
10 TABLET ORAL 2 TIMES DAILY
COMMUNITY

## 2023-02-15 RX ORDER — VALSARTAN AND HYDROCHLOROTHIAZIDE 320; 25 MG/1; MG/1
1 TABLET, FILM COATED ORAL DAILY
COMMUNITY

## 2023-02-15 RX ORDER — BACLOFEN 20 MG
500 TABLET ORAL DAILY
COMMUNITY

## 2025-02-08 ENCOUNTER — HOSPITAL ENCOUNTER (OUTPATIENT)
Facility: HOSPITAL | Age: 55
Setting detail: OBSERVATION
Discharge: PSYCHIATRIC OR SUBSTANCE USE FACILITY | DRG: 885 | End: 2025-02-10
Attending: EMERGENCY MEDICINE | Admitting: INTERNAL MEDICINE
Payer: COMMERCIAL

## 2025-02-08 DIAGNOSIS — F10.930 ALCOHOL WITHDRAWAL SYNDROME WITHOUT COMPLICATION (HCC): Primary | ICD-10-CM

## 2025-02-08 DIAGNOSIS — T50.901A ACCIDENTAL OVERDOSE, INITIAL ENCOUNTER: ICD-10-CM

## 2025-02-08 DIAGNOSIS — R45.851 SUICIDE IDEATION: ICD-10-CM

## 2025-02-08 LAB
ALBUMIN SERPL-MCNC: 3.9 G/DL (ref 3.5–5)
ALBUMIN/GLOB SERPL: 1 (ref 1.1–2.2)
ALP SERPL-CCNC: 81 U/L (ref 45–117)
ALT SERPL-CCNC: 26 U/L (ref 12–78)
AMPHET UR QL SCN: NEGATIVE
ANION GAP SERPL CALC-SCNC: 9 MMOL/L (ref 2–12)
APAP SERPL-MCNC: <2 UG/ML (ref 10–30)
AST SERPL W P-5'-P-CCNC: 19 U/L (ref 15–37)
BARBITURATES UR QL SCN: NEGATIVE
BASOPHILS # BLD: 0.01 K/UL (ref 0–0.1)
BASOPHILS NFR BLD: 0.1 % (ref 0–1)
BENZODIAZ UR QL: NEGATIVE
BILIRUB SERPL-MCNC: 0.3 MG/DL (ref 0.2–1)
BUN SERPL-MCNC: 9 MG/DL (ref 6–20)
BUN/CREAT SERPL: 10 (ref 12–20)
CA-I BLD-MCNC: 9.2 MG/DL (ref 8.5–10.1)
CANNABINOIDS UR QL SCN: NEGATIVE
CHLORIDE SERPL-SCNC: 96 MMOL/L (ref 97–108)
CO2 SERPL-SCNC: 23 MMOL/L (ref 21–32)
COCAINE UR QL SCN: NEGATIVE
CREAT SERPL-MCNC: 0.88 MG/DL (ref 0.7–1.3)
DATE LAST DOSE: ABNORMAL
DATE LAST DOSE: NORMAL
DIFFERENTIAL METHOD BLD: ABNORMAL
DOSE AMOUNT: ABNORMAL UNITS
DOSE AMOUNT: NORMAL UNITS
EKG ATRIAL RATE: 101 BPM
EKG DIAGNOSIS: NORMAL
EKG P AXIS: 74 DEGREES
EKG P-R INTERVAL: 142 MS
EKG Q-T INTERVAL: 360 MS
EKG QRS DURATION: 94 MS
EKG QTC CALCULATION (BAZETT): 466 MS
EKG R AXIS: 76 DEGREES
EKG T AXIS: 51 DEGREES
EKG VENTRICULAR RATE: 101 BPM
EOSINOPHIL # BLD: 0.1 K/UL (ref 0–0.4)
EOSINOPHIL NFR BLD: 0.9 % (ref 0–7)
ERYTHROCYTE [DISTWIDTH] IN BLOOD BY AUTOMATED COUNT: 13.1 % (ref 11.5–14.5)
ETHANOL SERPL-MCNC: 270 MG/DL (ref 0–0.08)
GLOBULIN SER CALC-MCNC: 4 G/DL (ref 2–4)
GLUCOSE SERPL-MCNC: 100 MG/DL (ref 65–100)
HCT VFR BLD AUTO: 38.8 % (ref 36.6–50.3)
HGB BLD-MCNC: 13.6 G/DL (ref 12.1–17)
IMM GRANULOCYTES # BLD AUTO: 0.04 K/UL (ref 0–0.04)
IMM GRANULOCYTES NFR BLD AUTO: 0.4 % (ref 0–0.5)
INR PPP: 0.8 (ref 0.9–1.1)
LYMPHOCYTES # BLD: 3.02 K/UL (ref 0.8–3.5)
LYMPHOCYTES NFR BLD: 28.4 % (ref 12–49)
Lab: NORMAL
MCH RBC QN AUTO: 30.6 PG (ref 26–34)
MCHC RBC AUTO-ENTMCNC: 35.1 G/DL (ref 30–36.5)
MCV RBC AUTO: 87.4 FL (ref 80–99)
METHADONE UR QL: NEGATIVE
MONOCYTES # BLD: 0.62 K/UL (ref 0–1)
MONOCYTES NFR BLD: 5.8 % (ref 5–13)
NEUTS SEG # BLD: 6.84 K/UL (ref 1.8–8)
NEUTS SEG NFR BLD: 64.4 % (ref 32–75)
NRBC # BLD: 0 K/UL (ref 0–0.01)
NRBC BLD-RTO: 0 PER 100 WBC
OPIATES UR QL: NEGATIVE
PCP UR QL: NEGATIVE
PLATELET # BLD AUTO: 401 K/UL (ref 150–400)
PMV BLD AUTO: 8.6 FL (ref 8.9–12.9)
POTASSIUM SERPL-SCNC: 3.4 MMOL/L (ref 3.5–5.1)
PROT SERPL-MCNC: 7.9 G/DL (ref 6.4–8.2)
PROTHROMBIN TIME: 11.8 SEC (ref 11.9–14.6)
RBC # BLD AUTO: 4.44 M/UL (ref 4.1–5.7)
SALICYLATES SERPL-MCNC: 5 MG/DL (ref 2.8–20)
SODIUM SERPL-SCNC: 128 MMOL/L (ref 136–145)
WBC # BLD AUTO: 10.6 K/UL (ref 4.1–11.1)

## 2025-02-08 PROCEDURE — 85610 PROTHROMBIN TIME: CPT

## 2025-02-08 PROCEDURE — 6370000000 HC RX 637 (ALT 250 FOR IP): Performed by: EMERGENCY MEDICINE

## 2025-02-08 PROCEDURE — G0378 HOSPITAL OBSERVATION PER HR: HCPCS

## 2025-02-08 PROCEDURE — 2500000003 HC RX 250 WO HCPCS: Performed by: INTERNAL MEDICINE

## 2025-02-08 PROCEDURE — 96361 HYDRATE IV INFUSION ADD-ON: CPT

## 2025-02-08 PROCEDURE — 6370000000 HC RX 637 (ALT 250 FOR IP): Performed by: INTERNAL MEDICINE

## 2025-02-08 PROCEDURE — 2580000003 HC RX 258: Performed by: INTERNAL MEDICINE

## 2025-02-08 PROCEDURE — 6360000002 HC RX W HCPCS: Performed by: INTERNAL MEDICINE

## 2025-02-08 PROCEDURE — 80307 DRUG TEST PRSMV CHEM ANLYZR: CPT

## 2025-02-08 PROCEDURE — 82077 ASSAY SPEC XCP UR&BREATH IA: CPT

## 2025-02-08 PROCEDURE — 80053 COMPREHEN METABOLIC PANEL: CPT

## 2025-02-08 PROCEDURE — 99285 EMERGENCY DEPT VISIT HI MDM: CPT

## 2025-02-08 PROCEDURE — 80179 DRUG ASSAY SALICYLATE: CPT

## 2025-02-08 PROCEDURE — 80143 DRUG ASSAY ACETAMINOPHEN: CPT

## 2025-02-08 PROCEDURE — 93005 ELECTROCARDIOGRAM TRACING: CPT | Performed by: EMERGENCY MEDICINE

## 2025-02-08 PROCEDURE — 85025 COMPLETE CBC W/AUTO DIFF WBC: CPT

## 2025-02-08 PROCEDURE — 96374 THER/PROPH/DIAG INJ IV PUSH: CPT

## 2025-02-08 RX ORDER — SODIUM CHLORIDE 0.9 % (FLUSH) 0.9 %
5-40 SYRINGE (ML) INJECTION EVERY 12 HOURS SCHEDULED
Status: DISCONTINUED | OUTPATIENT
Start: 2025-02-08 | End: 2025-02-10 | Stop reason: HOSPADM

## 2025-02-08 RX ORDER — MAGNESIUM SULFATE IN WATER 40 MG/ML
2000 INJECTION, SOLUTION INTRAVENOUS PRN
Status: DISCONTINUED | OUTPATIENT
Start: 2025-02-08 | End: 2025-02-10 | Stop reason: HOSPADM

## 2025-02-08 RX ORDER — POTASSIUM CHLORIDE 750 MG/1
40 TABLET, EXTENDED RELEASE ORAL ONCE
Status: COMPLETED | OUTPATIENT
Start: 2025-02-08 | End: 2025-02-08

## 2025-02-08 RX ORDER — DIAZEPAM 5 MG/1
5 TABLET ORAL
Status: DISCONTINUED | OUTPATIENT
Start: 2025-02-08 | End: 2025-02-10 | Stop reason: HOSPADM

## 2025-02-08 RX ORDER — SODIUM CHLORIDE 0.9 % (FLUSH) 0.9 %
5-40 SYRINGE (ML) INJECTION PRN
Status: DISCONTINUED | OUTPATIENT
Start: 2025-02-08 | End: 2025-02-10 | Stop reason: HOSPADM

## 2025-02-08 RX ORDER — PANTOPRAZOLE SODIUM 40 MG/1
40 TABLET, DELAYED RELEASE ORAL
Status: DISCONTINUED | OUTPATIENT
Start: 2025-02-09 | End: 2025-02-10 | Stop reason: HOSPADM

## 2025-02-08 RX ORDER — BUSPIRONE HYDROCHLORIDE 10 MG/1
10 TABLET ORAL 2 TIMES DAILY
Status: DISCONTINUED | OUTPATIENT
Start: 2025-02-08 | End: 2025-02-08

## 2025-02-08 RX ORDER — DIAZEPAM 10 MG/2ML
5 INJECTION, SOLUTION INTRAMUSCULAR; INTRAVENOUS
Status: DISCONTINUED | OUTPATIENT
Start: 2025-02-08 | End: 2025-02-10 | Stop reason: HOSPADM

## 2025-02-08 RX ORDER — SODIUM CHLORIDE 9 MG/ML
INJECTION, SOLUTION INTRAVENOUS CONTINUOUS
Status: DISPENSED | OUTPATIENT
Start: 2025-02-08 | End: 2025-02-09

## 2025-02-08 RX ORDER — HYDROCHLOROTHIAZIDE 25 MG/1
25 TABLET ORAL DAILY
Status: DISCONTINUED | OUTPATIENT
Start: 2025-02-08 | End: 2025-02-10 | Stop reason: HOSPADM

## 2025-02-08 RX ORDER — ACETAMINOPHEN 650 MG/1
650 SUPPOSITORY RECTAL EVERY 6 HOURS PRN
Status: DISCONTINUED | OUTPATIENT
Start: 2025-02-08 | End: 2025-02-10 | Stop reason: HOSPADM

## 2025-02-08 RX ORDER — VALSARTAN 160 MG/1
320 TABLET ORAL DAILY
Status: DISCONTINUED | OUTPATIENT
Start: 2025-02-08 | End: 2025-02-10 | Stop reason: HOSPADM

## 2025-02-08 RX ORDER — ACETAMINOPHEN 325 MG/1
650 TABLET ORAL EVERY 6 HOURS PRN
Status: DISCONTINUED | OUTPATIENT
Start: 2025-02-08 | End: 2025-02-10 | Stop reason: HOSPADM

## 2025-02-08 RX ORDER — DIAZEPAM 5 MG/1
20 TABLET ORAL
Status: DISCONTINUED | OUTPATIENT
Start: 2025-02-08 | End: 2025-02-10 | Stop reason: HOSPADM

## 2025-02-08 RX ORDER — FOLIC ACID 1 MG/1
1 TABLET ORAL DAILY
Status: DISCONTINUED | OUTPATIENT
Start: 2025-02-08 | End: 2025-02-10 | Stop reason: HOSPADM

## 2025-02-08 RX ORDER — SODIUM CHLORIDE 9 MG/ML
INJECTION, SOLUTION INTRAVENOUS PRN
Status: DISCONTINUED | OUTPATIENT
Start: 2025-02-08 | End: 2025-02-10 | Stop reason: HOSPADM

## 2025-02-08 RX ORDER — BUSPIRONE HYDROCHLORIDE 10 MG/1
10 TABLET ORAL 3 TIMES DAILY
Status: DISCONTINUED | OUTPATIENT
Start: 2025-02-08 | End: 2025-02-10 | Stop reason: HOSPADM

## 2025-02-08 RX ORDER — POTASSIUM CHLORIDE 7.45 MG/ML
10 INJECTION INTRAVENOUS PRN
Status: DISCONTINUED | OUTPATIENT
Start: 2025-02-08 | End: 2025-02-10 | Stop reason: HOSPADM

## 2025-02-08 RX ORDER — GAUZE BANDAGE 2" X 2"
100 BANDAGE TOPICAL DAILY
Status: DISCONTINUED | OUTPATIENT
Start: 2025-02-08 | End: 2025-02-10 | Stop reason: HOSPADM

## 2025-02-08 RX ORDER — POTASSIUM CHLORIDE 1500 MG/1
40 TABLET, EXTENDED RELEASE ORAL PRN
Status: DISCONTINUED | OUTPATIENT
Start: 2025-02-08 | End: 2025-02-10 | Stop reason: HOSPADM

## 2025-02-08 RX ORDER — QUETIAPINE FUMARATE 25 MG/1
50 TABLET, FILM COATED ORAL 2 TIMES DAILY
Status: DISCONTINUED | OUTPATIENT
Start: 2025-02-08 | End: 2025-02-10 | Stop reason: HOSPADM

## 2025-02-08 RX ORDER — DIAZEPAM 5 MG/1
15 TABLET ORAL
Status: DISCONTINUED | OUTPATIENT
Start: 2025-02-08 | End: 2025-02-10 | Stop reason: HOSPADM

## 2025-02-08 RX ORDER — ONDANSETRON 2 MG/ML
4 INJECTION INTRAMUSCULAR; INTRAVENOUS EVERY 6 HOURS PRN
Status: DISCONTINUED | OUTPATIENT
Start: 2025-02-08 | End: 2025-02-10 | Stop reason: HOSPADM

## 2025-02-08 RX ORDER — DIAZEPAM 10 MG/2ML
15 INJECTION, SOLUTION INTRAMUSCULAR; INTRAVENOUS
Status: DISCONTINUED | OUTPATIENT
Start: 2025-02-08 | End: 2025-02-10 | Stop reason: HOSPADM

## 2025-02-08 RX ORDER — DIAZEPAM 10 MG/2ML
20 INJECTION, SOLUTION INTRAMUSCULAR; INTRAVENOUS
Status: DISCONTINUED | OUTPATIENT
Start: 2025-02-08 | End: 2025-02-10 | Stop reason: HOSPADM

## 2025-02-08 RX ORDER — ENOXAPARIN SODIUM 100 MG/ML
40 INJECTION SUBCUTANEOUS DAILY
Status: DISCONTINUED | OUTPATIENT
Start: 2025-02-09 | End: 2025-02-10 | Stop reason: HOSPADM

## 2025-02-08 RX ORDER — 0.9 % SODIUM CHLORIDE 0.9 %
1000 INTRAVENOUS SOLUTION INTRAVENOUS ONCE
Status: COMPLETED | OUTPATIENT
Start: 2025-02-08 | End: 2025-02-08

## 2025-02-08 RX ORDER — VALSARTAN AND HYDROCHLOROTHIAZIDE 320; 25 MG/1; MG/1
1 TABLET, FILM COATED ORAL DAILY
Status: DISCONTINUED | OUTPATIENT
Start: 2025-02-08 | End: 2025-02-08

## 2025-02-08 RX ORDER — DIAZEPAM 5 MG/1
10 TABLET ORAL
Status: DISCONTINUED | OUTPATIENT
Start: 2025-02-08 | End: 2025-02-10 | Stop reason: HOSPADM

## 2025-02-08 RX ORDER — DIAZEPAM 10 MG/2ML
10 INJECTION, SOLUTION INTRAMUSCULAR; INTRAVENOUS
Status: DISCONTINUED | OUTPATIENT
Start: 2025-02-08 | End: 2025-02-10 | Stop reason: HOSPADM

## 2025-02-08 RX ORDER — LANOLIN ALCOHOL/MO/W.PET/CERES
400 CREAM (GRAM) TOPICAL DAILY
Status: DISCONTINUED | OUTPATIENT
Start: 2025-02-09 | End: 2025-02-10 | Stop reason: HOSPADM

## 2025-02-08 RX ORDER — CHLORDIAZEPOXIDE HYDROCHLORIDE 25 MG/1
25 CAPSULE, GELATIN COATED ORAL
Status: COMPLETED | OUTPATIENT
Start: 2025-02-08 | End: 2025-02-08

## 2025-02-08 RX ORDER — NICOTINE 21 MG/24HR
1 PATCH, TRANSDERMAL 24 HOURS TRANSDERMAL DAILY
Status: DISCONTINUED | OUTPATIENT
Start: 2025-02-08 | End: 2025-02-10 | Stop reason: HOSPADM

## 2025-02-08 RX ORDER — ONDANSETRON 4 MG/1
4 TABLET, ORALLY DISINTEGRATING ORAL EVERY 8 HOURS PRN
Status: DISCONTINUED | OUTPATIENT
Start: 2025-02-08 | End: 2025-02-10 | Stop reason: HOSPADM

## 2025-02-08 RX ORDER — POLYETHYLENE GLYCOL 3350 17 G/17G
17 POWDER, FOR SOLUTION ORAL DAILY PRN
Status: DISCONTINUED | OUTPATIENT
Start: 2025-02-08 | End: 2025-02-10 | Stop reason: HOSPADM

## 2025-02-08 RX ORDER — TRAZODONE HYDROCHLORIDE 50 MG/1
50 TABLET ORAL NIGHTLY
Status: ON HOLD | COMMUNITY
Start: 2024-11-05 | End: 2025-02-13 | Stop reason: HOSPADM

## 2025-02-08 RX ORDER — LORAZEPAM 1 MG/1
2 TABLET ORAL
Status: COMPLETED | OUTPATIENT
Start: 2025-02-08 | End: 2025-02-08

## 2025-02-08 RX ORDER — MULTIVITAMIN WITH IRON
1 TABLET ORAL DAILY
Status: DISCONTINUED | OUTPATIENT
Start: 2025-02-08 | End: 2025-02-10 | Stop reason: HOSPADM

## 2025-02-08 RX ORDER — QUETIAPINE FUMARATE 50 MG/1
50 TABLET, FILM COATED ORAL NIGHTLY
Status: ON HOLD | COMMUNITY
Start: 2025-01-04 | End: 2025-02-13 | Stop reason: HOSPADM

## 2025-02-08 RX ORDER — BUPROPION HYDROCHLORIDE 100 MG/1
100 TABLET, EXTENDED RELEASE ORAL 2 TIMES DAILY
Status: DISCONTINUED | OUTPATIENT
Start: 2025-02-08 | End: 2025-02-10 | Stop reason: HOSPADM

## 2025-02-08 RX ADMIN — HYDROCHLOROTHIAZIDE 25 MG: 25 TABLET ORAL at 22:57

## 2025-02-08 RX ADMIN — BUSPIRONE HYDROCHLORIDE 10 MG: 10 TABLET ORAL at 22:57

## 2025-02-08 RX ADMIN — DIAZEPAM 5 MG: 5 TABLET ORAL at 23:29

## 2025-02-08 RX ADMIN — VALSARTAN 320 MG: 160 TABLET, FILM COATED ORAL at 23:01

## 2025-02-08 RX ADMIN — THIAMINE HCL TAB 100 MG 100 MG: 100 TAB at 19:06

## 2025-02-08 RX ADMIN — DIAZEPAM 5 MG: 5 INJECTION, SOLUTION INTRAMUSCULAR; INTRAVENOUS at 20:25

## 2025-02-08 RX ADMIN — QUETIAPINE FUMARATE 50 MG: 25 TABLET ORAL at 22:57

## 2025-02-08 RX ADMIN — Medication 5 ML: at 23:07

## 2025-02-08 RX ADMIN — THERA TABS 1 TABLET: TAB at 19:07

## 2025-02-08 RX ADMIN — SODIUM CHLORIDE, PRESERVATIVE FREE 5 ML: 5 INJECTION INTRAVENOUS at 23:07

## 2025-02-08 RX ADMIN — SODIUM CHLORIDE: 9 INJECTION, SOLUTION INTRAVENOUS at 20:30

## 2025-02-08 RX ADMIN — CHLORDIAZEPOXIDE HYDROCHLORIDE 25 MG: 25 CAPSULE ORAL at 17:56

## 2025-02-08 RX ADMIN — FOLIC ACID 1 MG: 1 TABLET ORAL at 19:07

## 2025-02-08 RX ADMIN — LORAZEPAM 2 MG: 1 TABLET ORAL at 17:33

## 2025-02-08 RX ADMIN — SODIUM CHLORIDE 1000 ML: 9 INJECTION, SOLUTION INTRAVENOUS at 20:24

## 2025-02-08 RX ADMIN — POTASSIUM CHLORIDE 40 MEQ: 750 TABLET, EXTENDED RELEASE ORAL at 19:06

## 2025-02-08 RX ADMIN — LORAZEPAM 2 MG: 1 TABLET ORAL at 17:57

## 2025-02-08 RX ADMIN — BUPROPION HYDROCHLORIDE 100 MG: 100 TABLET, FILM COATED, EXTENDED RELEASE ORAL at 22:58

## 2025-02-08 ASSESSMENT — LIFESTYLE VARIABLES
HOW OFTEN DO YOU HAVE A DRINK CONTAINING ALCOHOL: 4 OR MORE TIMES A WEEK
HOW MANY STANDARD DRINKS CONTAINING ALCOHOL DO YOU HAVE ON A TYPICAL DAY: 10 OR MORE

## 2025-02-08 ASSESSMENT — PAIN - FUNCTIONAL ASSESSMENT: PAIN_FUNCTIONAL_ASSESSMENT: NONE - DENIES PAIN

## 2025-02-08 NOTE — ED TRIAGE NOTES
Patient brought here per family with states of SI and ETOH abuse. Patient has not spoken to this nurse. Wife states PD was at their home

## 2025-02-09 LAB
ANION GAP SERPL CALC-SCNC: 8 MMOL/L (ref 2–12)
APAP SERPL-MCNC: <2 UG/ML (ref 10–30)
BASOPHILS # BLD: 0.02 K/UL (ref 0–0.1)
BASOPHILS NFR BLD: 0.2 % (ref 0–1)
BUN SERPL-MCNC: 10 MG/DL (ref 6–20)
BUN/CREAT SERPL: 12 (ref 12–20)
CA-I BLD-MCNC: 8.9 MG/DL (ref 8.5–10.1)
CHLORIDE SERPL-SCNC: 107 MMOL/L (ref 97–108)
CO2 SERPL-SCNC: 20 MMOL/L (ref 21–32)
CREAT SERPL-MCNC: 0.86 MG/DL (ref 0.7–1.3)
DATE LAST DOSE: ABNORMAL
DATE LAST DOSE: ABNORMAL
DIFFERENTIAL METHOD BLD: ABNORMAL
DOSE AMOUNT: ABNORMAL UNITS
DOSE AMOUNT: ABNORMAL UNITS
EOSINOPHIL # BLD: 0.19 K/UL (ref 0–0.4)
EOSINOPHIL NFR BLD: 2.2 % (ref 0–7)
ERYTHROCYTE [DISTWIDTH] IN BLOOD BY AUTOMATED COUNT: 13.1 % (ref 11.5–14.5)
GLUCOSE SERPL-MCNC: 104 MG/DL (ref 65–100)
HCT VFR BLD AUTO: 34.5 % (ref 36.6–50.3)
HGB BLD-MCNC: 12 G/DL (ref 12.1–17)
IMM GRANULOCYTES # BLD AUTO: 0.02 K/UL (ref 0–0.04)
IMM GRANULOCYTES NFR BLD AUTO: 0.2 % (ref 0–0.5)
LYMPHOCYTES # BLD: 3.26 K/UL (ref 0.8–3.5)
LYMPHOCYTES NFR BLD: 38.2 % (ref 12–49)
MCH RBC QN AUTO: 31 PG (ref 26–34)
MCHC RBC AUTO-ENTMCNC: 34.8 G/DL (ref 30–36.5)
MCV RBC AUTO: 89.1 FL (ref 80–99)
MONOCYTES # BLD: 0.76 K/UL (ref 0–1)
MONOCYTES NFR BLD: 8.9 % (ref 5–13)
NEUTS SEG # BLD: 4.28 K/UL (ref 1.8–8)
NEUTS SEG NFR BLD: 50.3 % (ref 32–75)
NRBC # BLD: 0 K/UL (ref 0–0.01)
NRBC BLD-RTO: 0 PER 100 WBC
PLATELET # BLD AUTO: 343 K/UL (ref 150–400)
PMV BLD AUTO: 8.9 FL (ref 8.9–12.9)
POTASSIUM SERPL-SCNC: 3.6 MMOL/L (ref 3.5–5.1)
RBC # BLD AUTO: 3.87 M/UL (ref 4.1–5.7)
SALICYLATES SERPL-MCNC: 2.7 MG/DL (ref 2.8–20)
SODIUM SERPL-SCNC: 135 MMOL/L (ref 136–145)
WBC # BLD AUTO: 8.5 K/UL (ref 4.1–11.1)

## 2025-02-09 PROCEDURE — 2500000003 HC RX 250 WO HCPCS: Performed by: INTERNAL MEDICINE

## 2025-02-09 PROCEDURE — 6360000002 HC RX W HCPCS: Performed by: INTERNAL MEDICINE

## 2025-02-09 PROCEDURE — G0378 HOSPITAL OBSERVATION PER HR: HCPCS

## 2025-02-09 PROCEDURE — 6370000000 HC RX 637 (ALT 250 FOR IP): Performed by: INTERNAL MEDICINE

## 2025-02-09 PROCEDURE — 96376 TX/PRO/DX INJ SAME DRUG ADON: CPT

## 2025-02-09 PROCEDURE — 96372 THER/PROPH/DIAG INJ SC/IM: CPT

## 2025-02-09 PROCEDURE — 85025 COMPLETE CBC W/AUTO DIFF WBC: CPT

## 2025-02-09 PROCEDURE — 80048 BASIC METABOLIC PNL TOTAL CA: CPT

## 2025-02-09 RX ADMIN — THIAMINE HCL TAB 100 MG 100 MG: 100 TAB at 09:55

## 2025-02-09 RX ADMIN — ENOXAPARIN SODIUM 40 MG: 100 INJECTION SUBCUTANEOUS at 09:56

## 2025-02-09 RX ADMIN — QUETIAPINE FUMARATE 50 MG: 25 TABLET ORAL at 09:55

## 2025-02-09 RX ADMIN — Medication 400 MG: at 09:55

## 2025-02-09 RX ADMIN — FOLIC ACID 1 MG: 1 TABLET ORAL at 09:55

## 2025-02-09 RX ADMIN — PANTOPRAZOLE SODIUM 40 MG: 40 TABLET, DELAYED RELEASE ORAL at 05:37

## 2025-02-09 RX ADMIN — BUSPIRONE HYDROCHLORIDE 10 MG: 10 TABLET ORAL at 14:08

## 2025-02-09 RX ADMIN — BUSPIRONE HYDROCHLORIDE 10 MG: 10 TABLET ORAL at 21:33

## 2025-02-09 RX ADMIN — Medication 5 ML: at 21:39

## 2025-02-09 RX ADMIN — DIAZEPAM 10 MG: 5 INJECTION, SOLUTION INTRAMUSCULAR; INTRAVENOUS at 15:11

## 2025-02-09 RX ADMIN — DIAZEPAM 5 MG: 5 TABLET ORAL at 14:08

## 2025-02-09 RX ADMIN — DIAZEPAM 10 MG: 5 INJECTION, SOLUTION INTRAMUSCULAR; INTRAVENOUS at 18:40

## 2025-02-09 RX ADMIN — BUPROPION HYDROCHLORIDE 100 MG: 100 TABLET, FILM COATED, EXTENDED RELEASE ORAL at 09:55

## 2025-02-09 RX ADMIN — BUPROPION HYDROCHLORIDE 100 MG: 100 TABLET, FILM COATED, EXTENDED RELEASE ORAL at 21:33

## 2025-02-09 RX ADMIN — QUETIAPINE FUMARATE 50 MG: 25 TABLET ORAL at 21:33

## 2025-02-09 RX ADMIN — THERA TABS 1 TABLET: TAB at 09:55

## 2025-02-09 RX ADMIN — DIAZEPAM 5 MG: 5 TABLET ORAL at 10:04

## 2025-02-09 RX ADMIN — SODIUM CHLORIDE, PRESERVATIVE FREE 5 ML: 5 INJECTION INTRAVENOUS at 21:38

## 2025-02-09 RX ADMIN — BUSPIRONE HYDROCHLORIDE 10 MG: 10 TABLET ORAL at 09:55

## 2025-02-09 NOTE — H&P
History & Physical    Primary Care Provider: Thomas Mathis MD  Source of Information: Patient/family unless mentioned otherwise below    Chief complaint:   Chief Complaint   Patient presents with    Psychiatric Evaluation    Alcohol Problem          HPI & Physical exam     History of presenting illness:    Jeferson Solis is a 54 y.o. male with PMH of hypertension, alcohol use disorder, tobacco use disorder, anxiety disorder and other medical problems as below. Presented to the ED with chief complaint of suicidal ideation.  Did attempt suicide last night by taking 4 Seroquel tablets. He drinks daily about 8 cans of 19oz beer daily. Last drink this AM. He has had alcohol withdrawal in the past, no seizure. However has hallucination. He reports hallucination is persistent regardless in withdrawal or not, questionable DT.       In the ED, noted tachycardic, lab significant for hyponatremia. On my evaluation, he reports still feeling anxious and jittery. No shortness of breath or other symptoms. Poison control reports no further monitor needed.       Chart review: none   Incidental imaging findings: none       Past Medical History:   Diagnosis Date    Anxiety disorder     Depression     GERD (gastroesophageal reflux disease)     Hypertension     Substance abuse (HCC)          Physical Exam:     /83   Pulse (!) 111   Temp 98 °F (36.7 °C) (Oral)   Resp 14   Ht 1.803 m (5' 11\")   Wt 72.6 kg (160 lb)   SpO2 97%   BMI 22.32 kg/m²    O2 Device: None (Room air)    General: Alert, cooperative, mild distress  Head: Normocephalic, without obvious abnormality, atraumatic.   Neck: Neck supple, symmetrical, trachea midline.   Eyes: Conjunctivae/corneas clear. PERRL, EOMs intact.  Oral: Lips, mucosa, and tongue normal.   Lungs: Clear to auscultation bilaterally.   Heart: Regular rate and rhythm, S1, S2 normal, no murmur, click, rub or gallop.  Abdomen: Soft, non-tender. Bowel sounds normal. No  obtained. Therefore, if necessary for medical care, recommend confirmation of positive findings by GC/MS.           Imaging:   No orders to display            Discussion/Medical Decision Making: Patient with numerous medical comorbidities, each with increased risk for mortality and morbidity if left untreated.   I have reviewed patient's presenting subjective and objective findings, as well as all laboratory studies, imaging studies, and vital signs to date as well as treatment rendered  and patient's response to those treatments.  In addition, prior medical, surgical and relevant social and family histories were reviewed.    I have discussed patient's presentation/findings and clinical course to date with ED provider. Given the patient's current clinical presentation, I have a high level of concern for decompensation if discharged from the emergency department and that patient warrants admission to hospital.    Signed By: Wilson Aleman Cha, MD     February 8, 2025

## 2025-02-09 NOTE — ED NOTES
Poison control called back at this time for a follow up on patient. Lab values given to poison control. They are aware of what floor the patient will be going to. Call back with any further updates.

## 2025-02-09 NOTE — PLAN OF CARE
Problem: Risk for Elopement  Goal: Patient will not exit the unit/facility without proper excort  Recent Flowsheet Documentation  Taken 2/8/2025 2130 by Mikey Cornejo, RN  Nursing Interventions for Elopement Risk:   Assist with personal care needs such as toileting, eating, dressing, as needed to reduce the risk of wandering   Reduce environmental triggers

## 2025-02-09 NOTE — ED PROVIDER NOTES
[facility] EMERGENCY DEPT  EMERGENCY DEPARTMENT HISTORY AND PHYSICAL EXAM      Date: 2025  Patient Name: Jeferson Solis  MRN: [id number]  Birthdate 1970  Date of evaluation: 2025  Provider: Lana Gutierrez MD   Note Started: 7:27 PM EST 25    HISTORY OF PRESENT ILLNESS     Chief Complaint   Patient presents with    Psychiatric Evaluation    Alcohol Problem       History Provided By: Patient    HPI: Jeferson Solis is a 54 y.o. male patient presents feeling depressed.  Suicidal.  Did attempt suicide last night by taking 4 Seroquel tablets.  He also drinks daily.  Drank less than usual today.  Has gone through alcohol withdrawal in the past.    PAST MEDICAL HISTORY   Past Medical History:  Past Medical History:   Diagnosis Date    Anxiety disorder     Depression     GERD (gastroesophageal reflux disease)     Hypertension     Substance abuse (HCC)        Past Surgical History:  No past surgical history on file.    Family History:  No family history on file.    Social History:  Social History     Tobacco Use    Smoking status: Former     Current packs/day: 0.00     Types: Cigarettes     Quit date: 3/26/2020     Years since quittin.8    Smokeless tobacco: Never   Substance Use Topics    Alcohol use: Not Currently     Alcohol/week: 24.0 standard drinks of alcohol    Drug use: Never       Allergies:  No Known Allergies    PCP: Thomas Mathis MD    Current Meds:   Current Facility-Administered Medications   Medication Dose Route Frequency Provider Last Rate Last Admin    sodium chloride flush 0.9 % injection 5-40 mL  5-40 mL IntraVENous 2 times per day Lana Gutierrez MD        sodium chloride flush 0.9 % injection 5-40 mL  5-40 mL IntraVENous PRN Lana Gutierrez MD        0.9 % sodium chloride infusion   IntraVENous PRN Lana Gutierrez MD        thiamine mononitrate tablet 100 mg  100 mg Oral Daily Lana Gutierrez MD   100 mg at 25 1906    multivitamin 1 tablet  1 tablet Oral

## 2025-02-09 NOTE — CARE COORDINATION
02/09/25 0951   Service Assessment   Patient Orientation Alert and Oriented   Cognition Alert   History Provided By Patient   Primary Caregiver Self   Support Systems Spouse/Significant Other   Patient's Healthcare Decision Maker is: Legal Next of Kin   PCP Verified by CM Yes   Last Visit to PCP Within last 6 months   Prior Functional Level Independent in ADLs/IADLs   Current Functional Level Independent in ADLs/IADLs   Can patient return to prior living arrangement Yes   Ability to make needs known: Good   Family able to assist with home care needs: Yes   Would you like for me to discuss the discharge plan with any other family members/significant others, and if so, who?   (Spouse)   Financial Resources Other (Comment)  (Commercial)   Social/Functional History   Lives With Spouse   Home Equipment None   Services At/After Discharge   Transition of Care Consult (CM Consult) Discharge Planning     OBS letter delivered.     Patient lives at address on file with his soon to be ex spouse. Independent at baseline. No DME. No hx of HH.     DCP: home    Advance Care Planning     General Advance Care Planning (ACP) Conversation    Date of Conversation: 2/9/2025  Conducted with: Patient with Decision Making Capacity  Other persons present: None    Healthcare Decision Maker: No healthcare decision makers have been documented.     Today we documented Decision Maker(s) consistent with Legal Next of Kin hierarchy.  Content/Action Overview:    Reviewed DNR/DNI and patient elects Full Code (Attempt Resuscitation)         Luna Cohen

## 2025-02-09 NOTE — PROGRESS NOTES
Hospitalist Progress Note               Daily Progress Note: 2/9/2025      Hospital Day: 2     Chief complaint:   Chief Complaint   Patient presents with    Psychiatric Evaluation    Alcohol Problem        Subjective:   Hospital course to date:    Jeferson Solis is a 54 y.o. male with PMH of hypertension, alcohol use disorder, tobacco use disorder, anxiety disorder and other medical problems as below. Presented to the ED with chief complaint of suicidal ideation.  Did attempt suicide last night by taking 4 Seroquel tablets. He drinks daily about 8 cans of 19oz beer daily. Last drink this AM. He has had alcohol withdrawal in the past, no seizure. However has hallucination. He reports hallucination is persistent regardless in withdrawal or not, questionable DT.         In the ED, noted tachycardic, lab significant for hyponatremia. On my evaluation, he reports still feeling anxious and jittery. No shortness of breath or other symptoms. Poison control reports no further monitor needed.   --------  Patient is seen today for follow-up. Patient seen and examined at bedside. No acute issues overnight. He denies any symptoms.     Medications reviewed  Current Facility-Administered Medications   Medication Dose Route Frequency    sodium chloride flush 0.9 % injection 5-40 mL  5-40 mL IntraVENous 2 times per day    sodium chloride flush 0.9 % injection 5-40 mL  5-40 mL IntraVENous PRN    0.9 % sodium chloride infusion   IntraVENous PRN    thiamine mononitrate tablet 100 mg  100 mg Oral Daily    multivitamin 1 tablet  1 tablet Oral Daily    folic acid (FOLVITE) tablet 1 mg  1 mg Oral Daily    diazePAM (VALIUM) tablet 5 mg  5 mg Oral Q1H PRN    Or    diazePAM (VALIUM) injection 5 mg  5 mg IntraVENous Q1H PRN    Or    diazePAM (VALIUM) tablet 10 mg  10 mg Oral Q1H PRN    Or    diazePAM (VALIUM) injection 10 mg  10 mg IntraVENous Q1H PRN    Or    diazePAM (VALIUM) tablet 15 mg  15 mg Oral Q1H PRN    Or    diazePAM

## 2025-02-09 NOTE — ED NOTES
ED TO INPATIENT SBAR HANDOFF    Patient Name: Jeferson Solis   Preferred Name: Carter  : 1970  54 y.o.   Family/Caregiver Present: no   Code Status Order: Full Code  PO Status: Adult regular  Telemetry Order: Yes  C-SSRS: Risk of Suicide: High Risk  Sitter none  Restraints:   none  Sepsis Risk Score      Situation  Chief Complaint   Patient presents with    Psychiatric Evaluation    Alcohol Problem     Brief Description of Patient's Condition: Patient brought to the ED by family due to concerns for SI. Patient presents with feeling depressed and stated he attempted suicide last night by taking 4 Seroquel tablets. Patient endorses drinking daily and has a hx of alcohol withdrawal.   Patient is a&o x 4 and ambulatory. CIWA 5    Mental Status: oriented, alert, coherent, logical, thought processes intact, and able to concentrate and follow conversation  Arrived from:Home  Imaging:   No orders to display     Abnormal labs:   Abnormal Labs Reviewed   CBC WITH AUTO DIFFERENTIAL - Abnormal; Notable for the following components:       Result Value    Platelets 401 (*)     MPV 8.6 (*)     All other components within normal limits   COMPREHENSIVE METABOLIC PANEL - Abnormal; Notable for the following components:    Sodium 128 (*)     Potassium 3.4 (*)     Chloride 96 (*)     BUN/Creatinine Ratio 10 (*)     Albumin/Globulin Ratio 1.0 (*)     All other components within normal limits   ETHANOL - Abnormal; Notable for the following components:    Ethanol Lvl 270 (*)     All other components within normal limits   PROTIME-INR - Abnormal; Notable for the following components:    Protime 11.8 (*)     INR 0.8 (*)     All other components within normal limits   ACETAMINOPHEN LEVEL - Abnormal; Notable for the following components:    Acetaminophen Level <2 (*)     All other components within normal limits       Background  Allergies: No Known Allergies  History:   Past Medical History:   Diagnosis Date    Anxiety disorder   2/8/25 2025)   buPROPion (WELLBUTRIN SR) extended release tablet 100 mg (has no administration in time range)   magnesium oxide (MAG-OX) tablet 400 mg (has no administration in time range)   0.9 % sodium chloride infusion ( IntraVENous New Bag 2/8/25 2030)   sodium chloride flush 0.9 % injection 5-40 mL (has no administration in time range)   sodium chloride flush 0.9 % injection 5-40 mL (has no administration in time range)   0.9 % sodium chloride infusion (has no administration in time range)   potassium chloride (KLOR-CON M) extended release tablet 40 mEq (has no administration in time range)     Or   potassium bicarb-citric acid (EFFER-K) effervescent tablet 40 mEq (has no administration in time range)     Or   potassium chloride 10 mEq/100 mL IVPB (Peripheral Line) (has no administration in time range)   magnesium sulfate 2000 mg in 50 mL IVPB premix (has no administration in time range)   enoxaparin (LOVENOX) injection 40 mg (has no administration in time range)   ondansetron (ZOFRAN-ODT) disintegrating tablet 4 mg (has no administration in time range)     Or   ondansetron (ZOFRAN) injection 4 mg (has no administration in time range)   polyethylene glycol (GLYCOLAX) packet 17 g (has no administration in time range)   acetaminophen (TYLENOL) tablet 650 mg (has no administration in time range)     Or   acetaminophen (TYLENOL) suppository 650 mg (has no administration in time range)   sodium chloride 0.9 % bolus 1,000 mL (1,000 mLs IntraVENous New Bag 2/8/25 2024)   nicotine (NICODERM CQ) 21 MG/24HR 1 patch (1 patch TransDERmal Patch Applied 2/8/25 2034)   QUEtiapine (SEROQUEL) tablet 50 mg (has no administration in time range)   busPIRone (BUSPAR) tablet 10 mg (has no administration in time range)   pantoprazole (PROTONIX) tablet 40 mg (has no administration in time range)   valsartan (DIOVAN) tablet 320 mg (has no administration in time range)     And   hydroCHLOROthiazide (HYDRODIURIL) tablet 25 mg (has no

## 2025-02-09 NOTE — BH NOTE
Consult Note              Reason for psychiatric consult-suicidal ideation/attempt/intentional overdose      Consult Date: 2/9/2025    Inpatient consult to Psychiatry  Consult performed by: Helena Escalante MD  Consult ordered by: Mateo Morfin MD        History of presenting illness-Jeferson \"Carter\" 54-year-old male who presented to the emergency department reported feeling depressed suicidal and had attempted last night by taking 4 Seroquel tablets of 50 mg along with drinking alcohol as a suicidal attempt.  Patient in the emergency department reported to be increasingly tachycardic with elevated CIWA score, low sodium.  Patient was admitted to the medical floor for further medical stabilization.    Patient seen this morning who expresses feeling depressed.  He states his stressors relates to his marriage falling apart, anger issues secondary to the past, increased use of alcohol.  Patient feels slightly better this morning with his physical symptoms and some decrease in his withdrawal.  He agrees to get further help for his depression.  Denies having any auditory or visual hallucinations or paranoia or perceptual disturbances.  No reported DTs.    No reported history of withdrawal seizures.    Mental status examination-  patient is alert oriented to name place person.  Patient presents depressed  Currently denies having any active suicidal thoughts plans or intent  Patient presents after attempted overdose on Seroquel as a suicidal attempt  Patient denies having any any command hallucinations.  Denies having any paranoia.  Insight judgment coping limited    Assessment/plan-  Major depressive disorder, moderate recurrent with suicidal attempt/gesture with intentional overdose on Seroquel    Hyponatremia - received IV fluids     Alcohol withdrawal-on CIWA protocol.  On Valium still tachycardic    He has been restarted on his home medications on the medical floor  which includes Wellbutrin  twice daily  BuSpar,  Granulocytes Absolute 0.02 0.00 - 0.04 K/UL    Differential Type AUTOMATED         Physical Exam

## 2025-02-09 NOTE — PLAN OF CARE
Problem: Discharge Planning  Goal: Discharge to home or other facility with appropriate resources  Outcome: Progressing  Flowsheets (Taken 2/8/2025 2229 by Juancarlos Burns, RN)  Discharge to home or other facility with appropriate resources: Identify barriers to discharge with patient and caregiver     Problem: Risk for Elopement  Goal: Patient will not exit the unit/facility without proper excort  Outcome: Progressing  Flowsheets (Taken 2/8/2025 2130 by Mikey Cornejo, RN)  Nursing Interventions for Elopement Risk:   Assist with personal care needs such as toileting, eating, dressing, as needed to reduce the risk of wandering   Reduce environmental triggers     Problem: Safety - Adult  Goal: Free from fall injury  2/9/2025 0806 by Jeane Nino, RN  Outcome: Progressing  2/9/2025 0229 by Juancarlos Burns, RN  Outcome: Progressing

## 2025-02-09 NOTE — PROGRESS NOTES
.4 Eyes Skin Assessment     NAME:  Jeferson Solis  YOB: 1970  MEDICAL RECORD NUMBER:  [id number]    The patient is being assessed for  Admission    I agree that at least one RN has performed a thorough Head to Toe Skin Assessment on the patient. ALL assessment sites listed below have been assessed.      Areas assessed by both nurses:    Head, Face, Ears, Shoulders, Back, Chest, Arms, Elbows, Hands, Sacrum. Buttock, Coccyx, Ischium, Legs. Feet and Heels, and Under Medical Devices         Does the Patient have a Wound? No noted wound(s)       Frank Prevention initiated by RN: Yes  Wound Care Orders initiated by RN: No    Pressure Injury (Stage 3,4, Unstageable, DTI, NWPT, and Complex wounds) if present, place Wound referral order by RN under : No    New Ostomies, if present place, Ostomy referral order under : No     Nurse 1 eSignature: Electronically signed by Juancarlos Burns RN on 2/9/25 at 5:06 AM EST    **SHARE this note so that the co-signing nurse can place an eSignature**    Nurse 2 eSignature: {Esignature:[id number]}

## 2025-02-09 NOTE — PROGRESS NOTES
Spoke with poison control asking for an update; updated that patient is stable and back to baseline at this time; poison control states that case will be closed at this time

## 2025-02-09 NOTE — ED NOTES
Oliverio Bartlett from Tele; patient on box 16.  From: ED 25  To: 585    Patient in hospital gown; patient and belongings wanded by security at this time.

## 2025-02-10 ENCOUNTER — HOSPITAL ENCOUNTER (INPATIENT)
Facility: HOSPITAL | Age: 55
LOS: 3 days | Discharge: HOME | DRG: 885 | End: 2025-02-13
Attending: PSYCHIATRY & NEUROLOGY | Admitting: PSYCHIATRY & NEUROLOGY
Payer: COMMERCIAL

## 2025-02-10 VITALS
OXYGEN SATURATION: 100 % | SYSTOLIC BLOOD PRESSURE: 131 MMHG | HEIGHT: 71 IN | TEMPERATURE: 97.6 F | BODY MASS INDEX: 22.4 KG/M2 | WEIGHT: 160 LBS | HEART RATE: 86 BPM | DIASTOLIC BLOOD PRESSURE: 85 MMHG | RESPIRATION RATE: 18 BRPM

## 2025-02-10 PROBLEM — F33.1 MAJOR DEPRESSIVE DISORDER, RECURRENT, MODERATE (HCC): Status: ACTIVE | Noted: 2025-02-10

## 2025-02-10 PROBLEM — F32.9 MDD (MAJOR DEPRESSIVE DISORDER), SINGLE EPISODE: Status: ACTIVE | Noted: 2025-02-10

## 2025-02-10 LAB
ANION GAP SERPL CALC-SCNC: 4 MMOL/L (ref 2–12)
BASOPHILS # BLD: 0.02 K/UL (ref 0–0.1)
BASOPHILS NFR BLD: 0.2 % (ref 0–1)
BUN SERPL-MCNC: 11 MG/DL (ref 6–20)
BUN/CREAT SERPL: 11 (ref 12–20)
CA-I BLD-MCNC: 9.1 MG/DL (ref 8.5–10.1)
CHLORIDE SERPL-SCNC: 111 MMOL/L (ref 97–108)
CO2 SERPL-SCNC: 27 MMOL/L (ref 21–32)
CREAT SERPL-MCNC: 0.98 MG/DL (ref 0.7–1.3)
DIFFERENTIAL METHOD BLD: ABNORMAL
EOSINOPHIL # BLD: 0.23 K/UL (ref 0–0.4)
EOSINOPHIL NFR BLD: 2.8 % (ref 0–7)
ERYTHROCYTE [DISTWIDTH] IN BLOOD BY AUTOMATED COUNT: 13.4 % (ref 11.5–14.5)
GLUCOSE SERPL-MCNC: 105 MG/DL (ref 65–100)
HCT VFR BLD AUTO: 35.5 % (ref 36.6–50.3)
HGB BLD-MCNC: 12.2 G/DL (ref 12.1–17)
IMM GRANULOCYTES # BLD AUTO: 0.03 K/UL (ref 0–0.04)
IMM GRANULOCYTES NFR BLD AUTO: 0.4 % (ref 0–0.5)
LYMPHOCYTES # BLD: 2.34 K/UL (ref 0.8–3.5)
LYMPHOCYTES NFR BLD: 28.6 % (ref 12–49)
MCH RBC QN AUTO: 31.4 PG (ref 26–34)
MCHC RBC AUTO-ENTMCNC: 34.4 G/DL (ref 30–36.5)
MCV RBC AUTO: 91.3 FL (ref 80–99)
MONOCYTES # BLD: 0.78 K/UL (ref 0–1)
MONOCYTES NFR BLD: 9.5 % (ref 5–13)
NEUTS SEG # BLD: 4.77 K/UL (ref 1.8–8)
NEUTS SEG NFR BLD: 58.5 % (ref 32–75)
NRBC # BLD: 0 K/UL (ref 0–0.01)
NRBC BLD-RTO: 0 PER 100 WBC
PLATELET # BLD AUTO: 331 K/UL (ref 150–400)
PMV BLD AUTO: 8.9 FL (ref 8.9–12.9)
POTASSIUM SERPL-SCNC: 3.8 MMOL/L (ref 3.5–5.1)
RBC # BLD AUTO: 3.89 M/UL (ref 4.1–5.7)
SODIUM SERPL-SCNC: 142 MMOL/L (ref 136–145)
WBC # BLD AUTO: 8.2 K/UL (ref 4.1–11.1)

## 2025-02-10 PROCEDURE — 6370000000 HC RX 637 (ALT 250 FOR IP): Performed by: PSYCHIATRY & NEUROLOGY

## 2025-02-10 PROCEDURE — 36415 COLL VENOUS BLD VENIPUNCTURE: CPT

## 2025-02-10 PROCEDURE — 96372 THER/PROPH/DIAG INJ SC/IM: CPT

## 2025-02-10 PROCEDURE — 85025 COMPLETE CBC W/AUTO DIFF WBC: CPT

## 2025-02-10 PROCEDURE — 6370000000 HC RX 637 (ALT 250 FOR IP): Performed by: INTERNAL MEDICINE

## 2025-02-10 PROCEDURE — 1240000000 HC EMOTIONAL WELLNESS R&B

## 2025-02-10 PROCEDURE — 6360000002 HC RX W HCPCS: Performed by: INTERNAL MEDICINE

## 2025-02-10 PROCEDURE — G0378 HOSPITAL OBSERVATION PER HR: HCPCS

## 2025-02-10 PROCEDURE — 2500000003 HC RX 250 WO HCPCS: Performed by: INTERNAL MEDICINE

## 2025-02-10 PROCEDURE — 80048 BASIC METABOLIC PNL TOTAL CA: CPT

## 2025-02-10 PROCEDURE — HZ2ZZZZ DETOXIFICATION SERVICES FOR SUBSTANCE ABUSE TREATMENT: ICD-10-PCS | Performed by: PSYCHIATRY & NEUROLOGY

## 2025-02-10 RX ORDER — PANTOPRAZOLE SODIUM 40 MG/1
40 TABLET, DELAYED RELEASE ORAL
Status: DISCONTINUED | OUTPATIENT
Start: 2025-02-11 | End: 2025-02-13 | Stop reason: HOSPADM

## 2025-02-10 RX ORDER — HYDROXYZINE HYDROCHLORIDE 50 MG/1
50 TABLET, FILM COATED ORAL 3 TIMES DAILY PRN
Status: DISCONTINUED | OUTPATIENT
Start: 2025-02-10 | End: 2025-02-13 | Stop reason: HOSPADM

## 2025-02-10 RX ORDER — ACETAMINOPHEN 325 MG/1
650 TABLET ORAL EVERY 4 HOURS PRN
Status: DISCONTINUED | OUTPATIENT
Start: 2025-02-10 | End: 2025-02-13 | Stop reason: HOSPADM

## 2025-02-10 RX ORDER — TRAZODONE HYDROCHLORIDE 50 MG/1
50 TABLET ORAL NIGHTLY PRN
Status: DISCONTINUED | OUTPATIENT
Start: 2025-02-10 | End: 2025-02-13 | Stop reason: HOSPADM

## 2025-02-10 RX ORDER — MAGNESIUM HYDROXIDE/ALUMINUM HYDROXICE/SIMETHICONE 120; 1200; 1200 MG/30ML; MG/30ML; MG/30ML
30 SUSPENSION ORAL EVERY 6 HOURS PRN
Status: DISCONTINUED | OUTPATIENT
Start: 2025-02-10 | End: 2025-02-13 | Stop reason: HOSPADM

## 2025-02-10 RX ORDER — BUSPIRONE HYDROCHLORIDE 10 MG/1
10 TABLET ORAL 3 TIMES DAILY
Status: DISCONTINUED | OUTPATIENT
Start: 2025-02-10 | End: 2025-02-13 | Stop reason: HOSPADM

## 2025-02-10 RX ORDER — MULTIVITAMIN WITH IRON
1 TABLET ORAL DAILY
Status: DISCONTINUED | OUTPATIENT
Start: 2025-02-11 | End: 2025-02-12

## 2025-02-10 RX ORDER — QUETIAPINE FUMARATE 25 MG/1
50 TABLET, FILM COATED ORAL 2 TIMES DAILY
Status: DISCONTINUED | OUTPATIENT
Start: 2025-02-10 | End: 2025-02-13 | Stop reason: HOSPADM

## 2025-02-10 RX ORDER — COLCHICINE 0.6 MG/1
0.6 TABLET ORAL 2 TIMES DAILY PRN
Status: DISCONTINUED | OUTPATIENT
Start: 2025-02-10 | End: 2025-02-13 | Stop reason: HOSPADM

## 2025-02-10 RX ORDER — THIAMINE MONONITRATE (VIT B1) 100 MG
100 TABLET ORAL DAILY
Qty: 30 TABLET | Refills: 0 | Status: SHIPPED | OUTPATIENT
Start: 2025-02-11

## 2025-02-10 RX ORDER — BUPROPION HYDROCHLORIDE 100 MG/1
100 TABLET, EXTENDED RELEASE ORAL 2 TIMES DAILY
Status: DISCONTINUED | OUTPATIENT
Start: 2025-02-10 | End: 2025-02-12

## 2025-02-10 RX ORDER — FOLIC ACID 1 MG/1
1 TABLET ORAL DAILY
Status: DISCONTINUED | OUTPATIENT
Start: 2025-02-11 | End: 2025-02-13 | Stop reason: HOSPADM

## 2025-02-10 RX ORDER — NICOTINE 21 MG/24HR
1 PATCH, TRANSDERMAL 24 HOURS TRANSDERMAL DAILY
Status: DISCONTINUED | OUTPATIENT
Start: 2025-02-11 | End: 2025-02-13 | Stop reason: HOSPADM

## 2025-02-10 RX ORDER — GAUZE BANDAGE 2" X 2"
100 BANDAGE TOPICAL DAILY
Status: DISCONTINUED | OUTPATIENT
Start: 2025-02-11 | End: 2025-02-13 | Stop reason: HOSPADM

## 2025-02-10 RX ORDER — NICOTINE 21 MG/24HR
1 PATCH, TRANSDERMAL 24 HOURS TRANSDERMAL DAILY
Qty: 30 PATCH | Refills: 3 | Status: SHIPPED | OUTPATIENT
Start: 2025-02-11

## 2025-02-10 RX ORDER — LANOLIN ALCOHOL/MO/W.PET/CERES
400 CREAM (GRAM) TOPICAL DAILY
Status: DISCONTINUED | OUTPATIENT
Start: 2025-02-11 | End: 2025-02-13 | Stop reason: HOSPADM

## 2025-02-10 RX ADMIN — SODIUM CHLORIDE, PRESERVATIVE FREE 10 ML: 5 INJECTION INTRAVENOUS at 10:04

## 2025-02-10 RX ADMIN — DIAZEPAM 5 MG: 5 TABLET ORAL at 05:14

## 2025-02-10 RX ADMIN — BUSPIRONE HYDROCHLORIDE 10 MG: 10 TABLET ORAL at 14:57

## 2025-02-10 RX ADMIN — QUETIAPINE FUMARATE 50 MG: 25 TABLET ORAL at 21:07

## 2025-02-10 RX ADMIN — PANTOPRAZOLE SODIUM 40 MG: 40 TABLET, DELAYED RELEASE ORAL at 05:14

## 2025-02-10 RX ADMIN — THERA TABS 1 TABLET: TAB at 10:04

## 2025-02-10 RX ADMIN — BUPROPION HYDROCHLORIDE 100 MG: 100 TABLET, FILM COATED, EXTENDED RELEASE ORAL at 21:07

## 2025-02-10 RX ADMIN — ENOXAPARIN SODIUM 40 MG: 100 INJECTION SUBCUTANEOUS at 10:07

## 2025-02-10 RX ADMIN — BUSPIRONE HYDROCHLORIDE 10 MG: 10 TABLET ORAL at 21:07

## 2025-02-10 RX ADMIN — ACETAMINOPHEN 650 MG: 325 TABLET ORAL at 05:13

## 2025-02-10 RX ADMIN — Medication 400 MG: at 10:03

## 2025-02-10 RX ADMIN — BUSPIRONE HYDROCHLORIDE 10 MG: 10 TABLET ORAL at 10:03

## 2025-02-10 RX ADMIN — BUPROPION HYDROCHLORIDE 100 MG: 100 TABLET, FILM COATED, EXTENDED RELEASE ORAL at 10:04

## 2025-02-10 RX ADMIN — FOLIC ACID 1 MG: 1 TABLET ORAL at 10:04

## 2025-02-10 RX ADMIN — THIAMINE HCL TAB 100 MG 100 MG: 100 TAB at 10:04

## 2025-02-10 RX ADMIN — QUETIAPINE FUMARATE 50 MG: 25 TABLET ORAL at 10:03

## 2025-02-10 RX ADMIN — HYDROXYZINE HYDROCHLORIDE 50 MG: 50 TABLET ORAL at 21:08

## 2025-02-10 ASSESSMENT — LIFESTYLE VARIABLES
HOW MANY STANDARD DRINKS CONTAINING ALCOHOL DO YOU HAVE ON A TYPICAL DAY: 5 OR 6
HOW OFTEN DO YOU HAVE A DRINK CONTAINING ALCOHOL: 4 OR MORE TIMES A WEEK

## 2025-02-10 ASSESSMENT — SLEEP AND FATIGUE QUESTIONNAIRES
AVERAGE NUMBER OF SLEEP HOURS: 5
SLEEP PATTERN: DIFFICULTY FALLING ASLEEP
DO YOU USE A SLEEP AID: NO
DO YOU HAVE DIFFICULTY SLEEPING: YES

## 2025-02-10 NOTE — PROGRESS NOTES
Spiritual Health History and Assessment/Progress Note  [facility]    Loneliness/Social Isolation,  ,  ,      Name: Jeferson Solis MRN: [id number]    Age: 54 y.o.     Sex: male   Language: English   Temple: Other   Alcohol withdrawal, uncomplicated (HCC)     Date: 2/10/2025                           Spiritual Assessment began in [facility]            Encounter Overview/Reason: Loneliness/Social Isolation  Service Provided For: Patient not available    [place]a, Belief, Meaning:   Patient unable to assess at this time  Family/Friends No family/friends present      Importance and Influence:  Patient unable to assess at this time  Family/Friends No family/friends present    Community:  Patient Other: Unable to assess at this time  Family/Friends No family/friends present    Assessment and Plan of Care:     Patient Interventions include: Other: Respected patient's decision to have meeting with nurse leader and nurse instead of visit with   Family/Friends Interventions include: No family/friends present    Patient Plan of Care: Spiritual Care available upon further referral and left a pastoral care note for the patient requesting that he contact spiritual health services if and when he would like another attempt to visit with him for spiritual care.  Family/Friends Plan of Care: No family/friends present    Electronically signed by ALDO Horner on 2/10/2025 at 11:19 AM

## 2025-02-10 NOTE — PLAN OF CARE
Problem: Discharge Planning  Goal: Discharge to home or other facility with appropriate resources  Outcome: Progressing     Problem: Risk for Elopement  Goal: Patient will not exit the unit/facility without proper excort  Outcome: Progressing     Problem: Safety - Adult  Goal: Free from fall injury  2/10/2025 1112 by Jeane Nino, RN  Outcome: Progressing  2/10/2025 0358 by Juancarlos Burns, RN  Outcome: Progressing

## 2025-02-10 NOTE — DISCHARGE SUMMARY
Physician Discharge Summary     Patient ID:    Jeferson Solis  [id number]  54 y.o.  1970    Admit date: 2/8/2025    Discharge date : 2/10/2025      Final Diagnoses:   Suicide ideation [R45.851]  Accidental overdose, initial encounter [T50.901A]  Alcohol withdrawal, uncomplicated (HCC) [F10.930]  Alcohol withdrawal syndrome without complication (HCC) [F10.930]  Suicidal ideation/attempt  major depressive disorder  drug overdose-Seroquel    Reason for Hospitalization:    Suicidal ideation/attempt  major depressive disorder  drug overdose-Seroquel  hyponatremia    Hospital Course:   this is a 54-year-old male with past medical history of hypertension, alcohol use disorder, tobacco use disorder, anxiety disorder presented the ED with suicidal ideation. In the ED patient did report that he had attempted suicide by taking four tablets of Seroquel 50 MG.  He also has a drinking problem and he reports that he drinks approximately 8 to 10 cans of 19 on severe daily. His last drink was the day of presentation in the morning. He reports undergoing alcohol withdrawal in the past and has endorsed hallucinations but denies having any seizures.  In the ED was found to be tachycardic, significant lab for hyponatremia patient was admitted for further workup of toxicity and suicidal ideation.  Poison control reports and no further monitoring needed.  Patient was put on suicide watch with a one-on-one sitter. Psychiatry was consulted.  The next a.m., on my assessment patient awake alert times three. He reports he feels much better and he reports he is not suicidal. He does have thoughts of suicide but reports that he would never act on it. He reports he has daughters with special need and would want to stay back for her.    He reports that he is under a lot of stress given ongoing separation with his wife.  His hyponatremia improved with IV fluids patient was also put on zero protocol for alcohol withdrawal

## 2025-02-10 NOTE — BH NOTE
[facility]  Admission Note     Admission Type: Voluntary        Reason for admission: Suicide attempt by OD. Patient took 4 Seroquel tablets and then drank alcohol.     Patient arrives to unit after being on medical unit to stabilize after OD attempt. Patient currently denies that it was an attempt to overdose and stated that his wife soon to be ex wife is exaggerating and wanted him in the hospital so she could pack her things up and move out. Patient denies any depression and only reports anxiety due to being here. Patient SI/HI. Patient denies AVH. Patient denies any withdrawal symptoms and none observed by this writer.  Addictive Behavior: Alcohol Abuse UDS negative ETOH- 270        Medical Problems:   Past Medical History:   Diagnosis Date    Anxiety disorder     Depression     GERD (gastroesophageal reflux disease)     Hypertension     Substance abuse (HCC)        Status EXAM: Patient calm and cooperative during admission process. Patient contracts for safety at this time.       Pt admitted with followings belongings:        Valuables placed in safe in security. Patient oriented to surroundings and program expectations and copy of patient rights given. Received admission packet. Consents reviewed, signed. Patient verbalize understanding.  Patient education on precautions.                  Anya Macdonald RN

## 2025-02-10 NOTE — CARE COORDINATION
0840: Chart reviewed.    Per notes; patient followed via psych with plan to be transferred to the [facility].    CM will continue to follow patient and recs of medical team.    1430: Nurse spoke with [facility] [phone] this morning.    1510: Discharge order noted.    CM telephoned [facility] speaking with Gilmer who states patient has a bed in the [facility] Bed 1.    Nurse on the phone giving report.

## 2025-02-11 PROCEDURE — 1240000000 HC EMOTIONAL WELLNESS R&B

## 2025-02-11 PROCEDURE — 6370000000 HC RX 637 (ALT 250 FOR IP): Performed by: PSYCHIATRY & NEUROLOGY

## 2025-02-11 RX ADMIN — PANTOPRAZOLE SODIUM 40 MG: 40 TABLET, DELAYED RELEASE ORAL at 06:34

## 2025-02-11 RX ADMIN — QUETIAPINE FUMARATE 50 MG: 25 TABLET ORAL at 20:39

## 2025-02-11 RX ADMIN — HYDROXYZINE HYDROCHLORIDE 50 MG: 50 TABLET ORAL at 20:40

## 2025-02-11 RX ADMIN — Medication 400 MG: at 08:28

## 2025-02-11 RX ADMIN — THERA TABS 1 TABLET: TAB at 08:28

## 2025-02-11 RX ADMIN — COLCHICINE 0.6 MG: 0.6 TABLET ORAL at 23:29

## 2025-02-11 RX ADMIN — BUSPIRONE HYDROCHLORIDE 10 MG: 10 TABLET ORAL at 20:40

## 2025-02-11 RX ADMIN — BUSPIRONE HYDROCHLORIDE 10 MG: 10 TABLET ORAL at 13:58

## 2025-02-11 RX ADMIN — BUPROPION HYDROCHLORIDE 100 MG: 100 TABLET, FILM COATED, EXTENDED RELEASE ORAL at 08:28

## 2025-02-11 RX ADMIN — BUSPIRONE HYDROCHLORIDE 10 MG: 10 TABLET ORAL at 08:28

## 2025-02-11 RX ADMIN — ACETAMINOPHEN 650 MG: 325 TABLET ORAL at 04:07

## 2025-02-11 RX ADMIN — QUETIAPINE FUMARATE 50 MG: 25 TABLET ORAL at 08:28

## 2025-02-11 RX ADMIN — THIAMINE HCL TAB 100 MG 100 MG: 100 TAB at 08:28

## 2025-02-11 RX ADMIN — BUPROPION HYDROCHLORIDE 100 MG: 100 TABLET, FILM COATED, EXTENDED RELEASE ORAL at 20:40

## 2025-02-11 RX ADMIN — ACETAMINOPHEN 650 MG: 325 TABLET ORAL at 23:29

## 2025-02-11 RX ADMIN — FOLIC ACID 1 MG: 1 TABLET ORAL at 08:28

## 2025-02-11 ASSESSMENT — PAIN SCALES - GENERAL
PAINLEVEL_OUTOF10: 9
PAINLEVEL_OUTOF10: 0
PAINLEVEL_OUTOF10: 4

## 2025-02-11 ASSESSMENT — PAIN SCALES - WONG BAKER: WONGBAKER_NUMERICALRESPONSE: NO HURT

## 2025-02-11 ASSESSMENT — PAIN DESCRIPTION - ORIENTATION
ORIENTATION: RIGHT;LEFT
ORIENTATION: LEFT

## 2025-02-11 ASSESSMENT — PAIN DESCRIPTION - LOCATION
LOCATION: SHOULDER
LOCATION: ELBOW

## 2025-02-11 ASSESSMENT — PAIN - FUNCTIONAL ASSESSMENT: PAIN_FUNCTIONAL_ASSESSMENT: ACTIVITIES ARE NOT PREVENTED

## 2025-02-11 ASSESSMENT — PAIN DESCRIPTION - DESCRIPTORS: DESCRIPTORS: ACHING;DISCOMFORT

## 2025-02-11 NOTE — PLAN OF CARE
Problem: Discharge Planning  Goal: Discharge to home or other facility with appropriate resources  2/11/2025 0238 by Ayla Beckham, RN  Outcome: Progressing  2/10/2025 1815 by Anya Macdonald, RN  Outcome: Progressing     Problem: Anxiety  Goal: Will report anxiety at manageable levels  Description: INTERVENTIONS:  1. Administer medication as ordered  2. Teach and rehearse alternative coping skills  3. Provide emotional support with 1:1 interaction with staff  Outcome: Progressing     Problem: Coping  Goal: Pt/Family able to verbalize concerns and demonstrate effective coping strategies  Description: INTERVENTIONS:  1. Assist patient/family to identify coping skills, available support systems and cultural and spiritual values  2. Provide emotional support, including active listening and acknowledgement of concerns of patient and caregivers  3. Reduce environmental stimuli, as able  4. Instruct patient/family in relaxation techniques, as appropriate  5. Assess for spiritual pain/suffering and initiate Spiritual Care, Psychosocial Clinical Specialist consults as needed  Outcome: Progressing     Problem: Decision Making  Goal: Pt/Family able to effectively weigh alternatives and participate in decision making related to treatment and care  Description: INTERVENTIONS:  1. Determine when there are differences between patient's view, family's view, and healthcare provider's view of condition  2. Facilitate patient and family articulation of goals for care  3. Help patient and family identify pros/cons of alternative solutions  4. Provide information as requested by patient/family  5. Respect patient/family right to receive or not to receive information  6. Serve as a liaison between patient and family and health care team  7. Initiate Consults from Ethics, Palliative Care or initiate Family Care Conference as is appropriate  Outcome: Progressing     Problem: Behavior  Goal: Pt/Family maintain appropriate behavior and

## 2025-02-11 NOTE — GROUP NOTE
Group Therapy Note    Date: 2/11/2025    Group Start Time: 1600  Group End Time: 1630  Group Topic: Process Group - Inpatient    [facility]R [address] [facility] ACUTE    Sofya Rojas        Group Therapy Note  Process group was focused on the 5 love languages. Writer provided the pts with handouts and they filled them out. Writer asked the pts what they believed their love language is and how it affects their relationships. Pts interacted well with the writer. Writer had to do the group later in the day due to a code being called at group time.  Attendees: 5-6       Patient's Goal:  \"to work on myself\"    Notes:  Pt interacted well and had good insight on his love language and was able to process he needs to work on communication with his loved one. He shared that his love language was physical touch but he likes to give gifts and his wife does not like gifts    Status After Intervention:  Improved    Participation Level: Active Listener and Interactive    Participation Quality: Appropriate, Attentive, and Sharing      Speech:  normal      Thought Process/Content: Logical  Linear      Affective Functioning: Congruent      Mood: depressed      Level of consciousness:  Alert, Oriented x4, and Attentive      Response to Learning: Able to verbalize current knowledge/experience, Able to verbalize/acknowledge new learning, and Capable of insight      Endings: None Reported    Modes of Intervention: Education and Support      Discipline Responsible: /Counselor      Signature:  Sofya Rojas

## 2025-02-11 NOTE — BH NOTE
Pt up ad meli on unit pleasant and cooperative with staff and peers. Pt presents with bright affect.  Pt accepted scheduled medications without difficulty and denied the need for any prns. Pt denied depression, anxiety, SI/HI/AVH. Pt denied pain or any physical complaints. Pt states the only time his anxiety comes is when he talks to his \"soon to be ex wife\". Pt consumed approximately 100% of breakfast. Q 15 min checks continued to ensure pt safety on unit.

## 2025-02-11 NOTE — BH NOTE
Writer spoke with pts wife and she shared that she is a recovering addict and continues seek outpatient services. She wants pt to continue to receive outpatient for alcohol. His father  from alcohol and that his mother placed a boundary and does not speak with pt due to drinking. He has dealt with a lot of loss that he buries down and does not process. She shared that pt blames her brother for a lot of things but it was his idea and that she explained that disability is a long process. She feels that the pt is jealous of her brother and does not understand why. She takes care of both of them. She reported that sex is a problem in the relationship but it is not because of her brother in the home, it is because \"I can't be attracted to someone who is drunk all the time\". Pt did try to detox himself and completed 3 days. Ruby stated that she had prior plans to help her mother and when she got home he was drunk and blamed her for his relapse. Ruby would like to see the pt process the grief, work on communication and get into an IOP that specializes with alcohol.

## 2025-02-11 NOTE — BH NOTE
PSA PART II ADDITIONAL INFORMATION        Access To Fire [employer]: No    Substance Use: Yes    Last Use: 2-08-25    Type of Substance: alcohol    Frequency of Use: daily     Request to See : No    If yes, notified : No    Guardian:No    Guardian Contact: pt is his own legal guardian     Release of Information Signed: No    Release of Information Signed For: pt denied

## 2025-02-11 NOTE — BH NOTE
PSYCHOSOCIAL ASSESSMENT  :Patient identifying info:   Jeferson Solis is a 55 y.o., male admitted 2/10/2025  5:48 PM     Presenting problem and precipitating factors: Pt was brought into the ED by family due to feeling depressed and suicide attempt with taking four 50mg Seroquel along with drinking. Pt reported stressors with his marriage falling apart, anger issues and increased alcohol use. Pt reported that he has been  for two years and that for a 1 1/2years his brother-in-law has been living with them. Pts wife recently moved out and he reported that he has a substance use issues with crack.    Mental status assessment: Pt presented with a lethargic, flat affect and congruent mood. Pt denied any SI/HI/Avh at the time and was orientated x4. Pt was calm, pleasant to speak with. Pts thoughts were clear, organized and logical. His speech garbled and difficult to understand at times. Pt has blaming insight and poor judgement. Pt did not appear to have a cognitive or memory impairment.    Strengths/Recreation/Coping Skills:Pt likes to work and listen to music    Collateral information: Pt denied    Current psychiatric /substance abuse providers and contact info: Pt saw a psychiatrist but is in need of a new one    Previous psychiatric/substance abuse providers and response to treatment: Pt has been hospitalized in the past and was at a substance use program 3-4 years ago    Family history of mental illness or substance abuse: Pt reported depression    Substance abuse history:    Social History     Tobacco Use    Smoking status: Former     Current packs/day: 0.00     Types: Cigarettes     Quit date: 3/26/2020     Years since quittin.8    Smokeless tobacco: Never   Substance Use Topics    Alcohol use: Not Currently     Alcohol/week: 24.0 standard drinks of alcohol       History of biomedical complications associated with substance abuse: Pt denied     Patient's current acceptance of treatment or

## 2025-02-12 PROCEDURE — 1240000000 HC EMOTIONAL WELLNESS R&B

## 2025-02-12 PROCEDURE — 6370000000 HC RX 637 (ALT 250 FOR IP): Performed by: PSYCHIATRY & NEUROLOGY

## 2025-02-12 RX ORDER — BUPROPION HYDROCHLORIDE 150 MG/1
150 TABLET, EXTENDED RELEASE ORAL 2 TIMES DAILY
Status: DISCONTINUED | OUTPATIENT
Start: 2025-02-12 | End: 2025-02-13 | Stop reason: HOSPADM

## 2025-02-12 RX ORDER — PANTOPRAZOLE SODIUM 40 MG/1
40 TABLET, DELAYED RELEASE ORAL
Status: COMPLETED | OUTPATIENT
Start: 2025-02-12 | End: 2025-02-12

## 2025-02-12 RX ORDER — TOPIRAMATE 25 MG/1
25 TABLET, FILM COATED ORAL 2 TIMES DAILY
Status: DISCONTINUED | OUTPATIENT
Start: 2025-02-12 | End: 2025-02-13 | Stop reason: HOSPADM

## 2025-02-12 RX ORDER — IBUPROFEN 600 MG/1
600 TABLET, FILM COATED ORAL EVERY 6 HOURS PRN
Status: DISCONTINUED | OUTPATIENT
Start: 2025-02-12 | End: 2025-02-13 | Stop reason: HOSPADM

## 2025-02-12 RX ADMIN — BUSPIRONE HYDROCHLORIDE 10 MG: 10 TABLET ORAL at 16:32

## 2025-02-12 RX ADMIN — HYDROXYZINE HYDROCHLORIDE 50 MG: 50 TABLET ORAL at 01:54

## 2025-02-12 RX ADMIN — TOPIRAMATE 25 MG: 25 TABLET, FILM COATED ORAL at 20:05

## 2025-02-12 RX ADMIN — TRAZODONE HYDROCHLORIDE 50 MG: 50 TABLET ORAL at 00:56

## 2025-02-12 RX ADMIN — ALUMINUM HYDROXIDE, MAGNESIUM HYDROXIDE, AND SIMETHICONE 30 ML: 1200; 120; 1200 SUSPENSION ORAL at 19:40

## 2025-02-12 RX ADMIN — THIAMINE HCL TAB 100 MG 100 MG: 100 TAB at 09:26

## 2025-02-12 RX ADMIN — TOPIRAMATE 25 MG: 25 TABLET, FILM COATED ORAL at 16:32

## 2025-02-12 RX ADMIN — BUPROPION HYDROCHLORIDE 150 MG: 150 TABLET, FILM COATED, EXTENDED RELEASE ORAL at 20:38

## 2025-02-12 RX ADMIN — PANTOPRAZOLE SODIUM 40 MG: 40 TABLET, DELAYED RELEASE ORAL at 22:58

## 2025-02-12 RX ADMIN — BUPROPION HYDROCHLORIDE 100 MG: 100 TABLET, FILM COATED, EXTENDED RELEASE ORAL at 09:26

## 2025-02-12 RX ADMIN — BUSPIRONE HYDROCHLORIDE 10 MG: 10 TABLET ORAL at 20:05

## 2025-02-12 RX ADMIN — IBUPROFEN 600 MG: 600 TABLET, FILM COATED ORAL at 20:05

## 2025-02-12 RX ADMIN — QUETIAPINE FUMARATE 50 MG: 25 TABLET ORAL at 20:05

## 2025-02-12 RX ADMIN — PANTOPRAZOLE SODIUM 40 MG: 40 TABLET, DELAYED RELEASE ORAL at 06:51

## 2025-02-12 RX ADMIN — THERA TABS 1 TABLET: TAB at 09:26

## 2025-02-12 RX ADMIN — BUSPIRONE HYDROCHLORIDE 10 MG: 10 TABLET ORAL at 09:26

## 2025-02-12 RX ADMIN — HYDROXYZINE HYDROCHLORIDE 50 MG: 50 TABLET ORAL at 20:06

## 2025-02-12 RX ADMIN — QUETIAPINE FUMARATE 50 MG: 25 TABLET ORAL at 09:26

## 2025-02-12 RX ADMIN — FOLIC ACID 1 MG: 1 TABLET ORAL at 09:26

## 2025-02-12 RX ADMIN — IBUPROFEN 600 MG: 600 TABLET, FILM COATED ORAL at 01:54

## 2025-02-12 RX ADMIN — IBUPROFEN 600 MG: 600 TABLET, FILM COATED ORAL at 06:51

## 2025-02-12 RX ADMIN — Medication 400 MG: at 09:26

## 2025-02-12 ASSESSMENT — PAIN DESCRIPTION - LOCATION
LOCATION: ELBOW

## 2025-02-12 ASSESSMENT — PAIN SCALES - GENERAL
PAINLEVEL_OUTOF10: 4
PAINLEVEL_OUTOF10: 0
PAINLEVEL_OUTOF10: 5
PAINLEVEL_OUTOF10: 5

## 2025-02-12 ASSESSMENT — PAIN DESCRIPTION - ORIENTATION
ORIENTATION: LEFT
ORIENTATION: LEFT

## 2025-02-12 ASSESSMENT — PAIN SCALES - WONG BAKER: WONGBAKER_NUMERICALRESPONSE: NO HURT

## 2025-02-12 NOTE — PROGRESS NOTES
hours.    Imaging Last 24 Hours:  No results found.  Assessment//Plan           Hospital Problems             Last Modified POA    * (Principal) Major depressive disorder, recurrent, moderate (HCC) 2/10/2025 Yes    MDD (major depressive disorder), single episode 2/10/2025 Yes     Assessment & Plan  Continue to address loss, grief, depression, alcohol dependence.  Started on Topamax 25 p.o. twice daily to address alcohol dependence  Hospital does not carry Campral.  No reported active suicidal thoughts no active withdrawal symptoms reported.  Family collateral reviewed from wife.  Therapeutic goals and paperwork provided to him.      Current Facility-Administered Medications:     ibuprofen (ADVIL;MOTRIN) tablet 600 mg, 600 mg, Oral, Q6H PRN, Helena Escalante MD, 600 mg at 02/12/25 0651    buPROPion (WELLBUTRIN SR) extended release tablet 150 mg, 150 mg, Oral, BID, Helena Escalante MD    topiramate (TOPAMAX) tablet 25 mg, 25 mg, Oral, BID, Helena Escalante MD    acetaminophen (TYLENOL) tablet 650 mg, 650 mg, Oral, Q4H PRN, Helena Escalante MD, 650 mg at 02/11/25 2329    hydrOXYzine HCl (ATARAX) tablet 50 mg, 50 mg, Oral, TID PRN, Helena Escalante MD, 50 mg at 02/12/25 0154    traZODone (DESYREL) tablet 50 mg, 50 mg, Oral, Nightly PRN, Helena Escalante MD, 50 mg at 02/12/25 0056    magnesium hydroxide (MILK OF MAGNESIA) 400 MG/5ML suspension 30 mL, 30 mL, Oral, Daily PRN, Helena Escalante MD    aluminum & magnesium hydroxide-simethicone (MAALOX) 200-200-20 MG/5ML suspension 30 mL, 30 mL, Oral, Q6H PRN, Helena Escalante MD    busPIRone (BUSPAR) tablet 10 mg, 10 mg, Oral, TID, Helena Escalante MD, 10 mg at 02/12/25 0926    folic acid (FOLVITE) tablet 1 mg, 1 mg, Oral, Daily, Helena Escalante MD, 1 mg at 02/12/25 0926    magnesium oxide (MAG-OX) tablet 400 mg, 400 mg, Oral, Daily, Helena Escalante MD, 400 mg at 02/12/25 0926    nicotine (NICODERM CQ) 21 MG/24HR 1 patch, 1 patch, TransDERmal, Daily, Helena Escalante MD, 1 patch at 02/12/25 0934    QUEtiapine (SEROQUEL) tablet 50  mg, 50 mg, Oral, BID, Helena Escalante MD, 50 mg at 02/12/25 0926    thiamine mononitrate tablet 100 mg, 100 mg, Oral, Daily, Helena Escalante MD, 100 mg at 02/12/25 0926    colchicine (COLCRYS) tablet 0.6 mg, 0.6 mg, Oral, BID PRN, Helena Escalante MD, 0.6 mg at 02/11/25 2329    pantoprazole (PROTONIX) tablet 40 mg, 40 mg, Oral, QAM AC, Helena Escalante MD, 40 mg at 02/12/25 0651   Electronically signed by Helena Escalante MD on 2/12/25 at 12:16 PM EST

## 2025-02-12 NOTE — GROUP NOTE
Group Therapy Note    Date: 2/11/2025    Group Start Time: 1845  Group End Time: 1930  Group Topic: Recreational    SSR 2 [facility] NON ACUTE    Kyara Pizarro        Group Therapy Note    Attendees: 4/6    Recreational Therapist facilitated structured leisure skills group to introduce healthy leisure skills as positive way to cope and manage mood.         Patient's Goal:  Attend groups daily    Notes:  Attended group and actively participated.  Pt listened to songs with peers.  Pt was receptive to intervention      Status After Intervention:  Improved    Participation Level: Active Listener    Participation Quality: Appropriate      Speech:  normal      Thought Process/Content: Logical      Affective Functioning: Congruent      Mood:  calm      Level of consciousness:  Alert      Response to Learning: Progressing to goal      Endings: None Reported    Modes of Intervention: Activity      Discipline Responsible: Recreational Therapist      Signature:  MARY Montana

## 2025-02-12 NOTE — BH NOTE
DISCHARGE SUMMARY    NAME:Jeferson Solis  : 1970  MRN: [id number]    The patient Jeferson Solis exhibits the ability to control behavior in a less restrictive environment.  Patient's level of functioning is improving.  No assaultive/destructive behavior has been observed for the past 24 hours.  No suicidal/homicidal threat or behavior has been observed for the past 24 hours.  There is no evidence of serious medication side effects.  Patient has not been in physical or protective restraints for at least the past 24 hours.    If weapons involved, how are they secured? N/a    Is patient aware of and in agreement with discharge plan? yes    Arrangements for medication:  Prescriptions will be sent to the [facility]    Copy of discharge instructions to provider?:  yes    Arrangements for transportation home:  Pt will be picked up by his wife    Keep all follow up appointments as scheduled, continue to take prescribed medications per physician instructions.  Mental health crisis number:  911 or your local mental health crisis line number at 163-323-1998      Mental Health Emergency WARM LINE      1-668-132-MH (6428)      M-F: 9am to 9pm      Sat & Sun: 5pm - 9pm  National suicide prevention lines:                             8-017-HGHXUTP (1-587.420.4879)       4-354-850-TALK (1-768.118.6197)    Crisis Text Line:  Text HOME to 570101

## 2025-02-12 NOTE — PLAN OF CARE
Problem: Anxiety  Goal: Will report anxiety at manageable levels  Description: INTERVENTIONS:  1. Administer medication as ordered  2. Teach and rehearse alternative coping skills  3. Provide emotional support with 1:1 interaction with staff  2/12/2025 1012 by Talia White RN  Outcome: Progressing  Flowsheets (Taken 2/12/2025 1005)  Will report anxiety at manageable levels:   Administer medication as ordered   Teach and rehearse alternative coping skills   Provide emotional support with 1:1 interaction with staff  2/11/2025 2141 by Augusta Cornejo RN  Outcome: Progressing  Flowsheets (Taken 2/11/2025 2100)  Will report anxiety at manageable levels: Administer medication as ordered     Problem: Coping  Goal: Pt/Family able to verbalize concerns and demonstrate effective coping strategies  Description: INTERVENTIONS:  1. Assist patient/family to identify coping skills, available support systems and cultural and spiritual values  2. Provide emotional support, including active listening and acknowledgement of concerns of patient and caregivers  3. Reduce environmental stimuli, as able  4. Instruct patient/family in relaxation techniques, as appropriate  5. Assess for spiritual pain/suffering and initiate Spiritual Care, Psychosocial Clinical Specialist consults as needed  2/12/2025 1012 by Talia White RN  Outcome: Progressing  Flowsheets (Taken 2/12/2025 1005)  Patient/family able to verbalize anxieties, fears, and concerns, and demonstrate effective coping:   Assist patient/family to identify coping skills, available support systems and cultural and spiritual values   Provide emotional support, including active listening and acknowledgement of concerns of patient and caregivers   Reduce environmental stimuli, as able  2/11/2025 2141 by Augusta Cornejo RN  Outcome: Progressing  Flowsheets (Taken 2/11/2025 2100)  Patient/family able to verbalize anxieties, fears, and concerns, and demonstrate  effective coping: Assist patient/family to identify coping skills, available support systems and cultural and spiritual values     Problem: Decision Making  Goal: Pt/Family able to effectively weigh alternatives and participate in decision making related to treatment and care  Description: INTERVENTIONS:  1. Determine when there are differences between patient's view, family's view, and healthcare provider's view of condition  2. Facilitate patient and family articulation of goals for care  3. Help patient and family identify pros/cons of alternative solutions  4. Provide information as requested by patient/family  5. Respect patient/family right to receive or not to receive information  6. Serve as a liaison between patient and family and health care team  7. Initiate Consults from Ethics, Palliative Care or initiate Family Care Conference as is appropriate  Outcome: Progressing  Flowsheets  Taken 2/12/2025 1005 by Talia White, RN  Patient/family able to effectively weigh alternatives and participate in decision making related to treatment and care:   Determine when there are differences between patient's view, family's view, and healthcare provider's view of condition   Respect patient/family right to receive or not to receive information   Help patient and family identify pros/cons of alternative solutions  Taken 2/11/2025 2100 by Augusta Cornejo, RN  Patient/family able to effectively weigh alternatives and participate in decision making related to treatment and care: Determine when there are differences between patient's view, family's view, and healthcare provider's view of condition

## 2025-02-12 NOTE — CARE COORDINATION
02/12/25 0902   ITP   Date of Plan 02/12/25   Date of Next Review 02/19/25   Primary Diagnosis Code Major depressive disorder, recurrent, moderate (HCC) F33.1   Barriers to Treatment Need for psychoeducation;Other (comment)  (substance use, grief)   Strengths Incorporated in Plan Acknowledging need for assistance;Verbal;Family supports   Plan of Care   Long Term Goal (LTG) Stated in patient/guardian terms \"to work on myself\"   Short Term Goal 1   Short Term Goal 1 Client will learn and demonstrate effective coping skills   Baseline Functioning Pt uses drinking to cope with stress   Target pt will learn and utilize two new healthy coping skills   Objectives Client will participate in individual therapy   Intervention 1 Acknowledge client strengths;Indvidual therapy   Frequency daily   Measured by Self report;Staff observation   Staff Responsible [facility] staff;Clinical staff   Intervention 2 Group therapy   Frequency daily   Measured by Self report;Staff observation   Staff Responsible [facility] staff;Clinical staff   Intervention 3 Referral to community services;Family involvement in treatment plan   Frequency prior to discharge   Measured by Behavioral data   Staff Responsible [facility] staff   STG Goal 1 Status: Patient Appears to be  Progressing toward treatment plan goal   Short Term Goal 2   Short Term Goal 2 Client will learn and demonstrate improved communication skills   Baseline Functioning pt reported not communicating well with others about his feelings   Target Pt will demonstrate and learn positive communication skills   Objectives Client will participate in individual therapy   Intervention 1 Acknowledge client strengths;Indvidual therapy   Frequency daily   Measured by Self report;Staff observation;Behavioral data   Staff Responsible [facility] staff   Intervention 2 Group therapy   Frequency daily   Measured by Behavioral data;Self report;Staff observation   Staff Responsible [facility] staff;Clinical staff   Intervention 3

## 2025-02-12 NOTE — CARE COORDINATION
02/12/25 1844   ITP   Date of Plan 02/12/25   Primary Diagnosis Code Major depressive disorder, recurrent, moderate (HCC) F33.1   Plan of Care   Long Term Goal (LTG) Stated in patient/guardian terms \"to work on myself\"   Short Term Goal 1   Short Term Goal 1 Client will learn and demonstrate effective coping skills   STG Goal 1 Status: Patient Appears to be  Goal has been met, formulating new goal   Short Term Goal 2   Short Term Goal 2 Client will learn and demonstrate improved communication skills   STG Goal 2 Status: Patient Appears to be  Goal has been met, formulating new goal   Crisis/Safety/Discharge Plan   Discharge Plan   ([facility])

## 2025-02-12 NOTE — BH NOTE
Behavioral Health Transition Record to Provider    Patient Name: Jeferson Solis  YOB: 1970  Medical Record Number: [id number]  Date of Admission: 2/10/2025  Date of Discharge: 2-13-25    Attending Provider: Helena Escalante MD  Discharging Provider: Dr. Escalante  To contact this individual call [phone] and ask the  to page.  If unavailable, ask to be transferred to Behavioral Health Provider on call.  A Behavioral Health Provider will be available on call 24/7 and during holidays.    Primary Care Provider: Thomas Mathis MD    No Known Allergies    Reason for Admission: Pt was brought into the ED by family due to feeling depressed and suicide attempt with taking four 50mg Seroquel along with drinking. Pt reported stressors with his marriage falling apart, anger issues and increased alcohol use. Pt reported that he has been  for two years and that for a 1 1/2years his brother-in-law has been living with them. Pts wife recently moved out and he reported that he has a substance use issues with crack.     Admission Diagnosis: Major depressive disorder, recurrent, moderate (HCC) [F33.1]  MDD (major depressive disorder), single episode [F32.9]    * No surgery found *    No results found for this visit on 02/10/25.    Immunizations administered during this encounter:   Immunization History   Administered Date(s) Administered    PPD Test 03/01/2021       Screening for Metabolic Disorders for Patients on Antipsychotic Medications  (Data obtained from the EMR)    Estimated Body Mass Index  Estimated body mass index is 22.32 kg/m² as calculated from the following:    Height as of this encounter: 1.803 m (5' 10.98\").    Weight as of this encounter: 72.6 kg (159 lb 15.8 oz).     Vital Signs/Blood Pressure  /82   Pulse 81   Temp 98.1 °F (36.7 °C) (Oral)   Resp 18   Ht 1.803 m (5' 10.98\")   Wt 72.6 kg (159 lb 15.8 oz)   SpO2 100%   BMI 22.32 kg/m²     Blood Glucose/Hemoglobin A1c  No  with the same name, and this prescription was added. Make sure you understand how and when to take each.     QUEtiapine 50 MG tablet  Commonly known as: SEROQUEL  Take 1 tablet by mouth 2 times daily  What changed: when to take this     traZODone 50 MG tablet  Commonly known as: DESYREL  Take 1 tablet by mouth nightly as needed for Sleep  What changed:   when to take this  reasons to take this  additional instructions           * This list has 2 medication(s) that are the same as other medications prescribed for you. Read the directions carefully, and ask your doctor or other care provider to review them with you.                CONTINUE taking these medications      magnesium Oxide 500 MG Tabs     valsartan-hydroCHLOROthiazide 320-25 MG per tablet  Commonly known as: DIOVAN-HCT     vitamin B-1 100 MG tablet  Commonly known as: THIAMINE  Take 1 tablet by mouth daily               Where to Get Your Medications        These medications were sent to [id number] - [place], [place] - [address] [facility] - P [phone] - F [phone]  [address] [place] [id number]      Phone: [phone]   buPROPion 150 MG extended release tablet  busPIRone 10 MG tablet  colchicine 0.6 MG tablet  nicotine 21 MG/24HR  pantoprazole 40 MG tablet  QUEtiapine 50 MG tablet  topiramate 25 MG tablet  traZODone 50 MG tablet           To obtain results of studies pending at discharge, please contact [phone]    Follow-up Information       Follow up With Specialties Details Why Contact Info    [facility]  Follow up on 4/1/2025 Pt has a new pt appointment at [facility] with Dr. Bagley  on 4-1-25 at 3PM. Pt should arrive 20 minutes early to fill out new pt paperwork [address] [place] [address] [phone]    [facility]  Go on 2/14/2025 Pt should go to [facility] and be connected with [facility] services [address] [place] [address], [place] [address]    Phone: [phone]

## 2025-02-12 NOTE — H&P
INITIAL PSYCHIATRIC EVALUATION            IDENTIFICATION:    Patient Name  Jeferson Solis   Date of Birth 1970   [facility] [id number]   Medical Record Number  [id number]      Age  55 y.o.   PCP Thomas Mathis MD   Admit date:  2/10/2025    Room Number  246/01  @ [facility]   Date of Service  2/10/2025            HISTORY       Patient transferred to Behavioral health Unit after medical stabilization    Jeferson \"Carter\" 54-year-old male who presented to the emergency department reported feeling depressed suicidal and had attempted last night by taking 4 Seroquel tablets of 50 mg along with drinking alcohol as a suicidal attempt.  Patient in the emergency department reported to be increasingly tachycardic with elevated CIWA score, low sodium.  Patient was admitted to the medical floor for further medical stabilization.     Patient seen this morning who expresses feeling depressed.  He states his stressors relates to his marriage falling apart, anger issues secondary to the past, increased use of alcohol.  Patient feels slightly better this morning with his physical symptoms and some decrease in his withdrawal.  He agrees to get further help for his depression.  Denies having any auditory or visual hallucinations or paranoia or perceptual disturbances.  No reported DTs.     No reported history of withdrawal seizures.     Mental status examination-  patient is alert oriented to name place person.  Patient presents depressed  Currently denies having any active suicidal thoughts plans or intent  Patient presents after attempted overdose on Seroquel as a suicidal attempt  Patient denies having any any command hallucinations.  Denies having any paranoia.  Insight judgment coping limited     Assessment/plan-  Major depressive disorder, moderate recurrent with suicidal attempt/gesture with intentional overdose on Seroquel     Hyponatremia - received IV fluids      Alcohol withdrawal-on CIWA protocol.  On

## 2025-02-12 NOTE — BH NOTE
julio is AOX4, ambulatory and able to make needs known. Pt has been in his room reading and working on his journal. Pt has been interacting well with peers and staff, isolates at times. Pt encouraged to attend groups to help learn new coping skills. Pt given all HS medications and PRNs as ordered, tolerated well. Pt denied SI/HI/AVH . Pt given HS snacks and fluids as tolerated. Pt woke c/o of Gout pain in left elbow, given PRNs as ordered and notified Boris for orders, motirn 600mg every 6 hrs added.  Staff will continue care plan as ordered.

## 2025-02-12 NOTE — PLAN OF CARE
Problem: Discharge Planning  Goal: Discharge to home or other facility with appropriate resources  2/11/2025 2141 by Augusta Cornejo RN  Outcome: Progressing  2/11/2025 0925 by Anya Macdonald RN  Outcome: Progressing     Problem: Anxiety  Goal: Will report anxiety at manageable levels  Description: INTERVENTIONS:  1. Administer medication as ordered  2. Teach and rehearse alternative coping skills  3. Provide emotional support with 1:1 interaction with staff  2/11/2025 2141 by Augusta Cornejo RN  Outcome: Progressing  2/11/2025 0925 by Anya Macdonald RN  Outcome: Progressing     Problem: Coping  Goal: Pt/Family able to verbalize concerns and demonstrate effective coping strategies  Description: INTERVENTIONS:  1. Assist patient/family to identify coping skills, available support systems and cultural and spiritual values  2. Provide emotional support, including active listening and acknowledgement of concerns of patient and caregivers  3. Reduce environmental stimuli, as able  4. Instruct patient/family in relaxation techniques, as appropriate  5. Assess for spiritual pain/suffering and initiate Spiritual Care, Psychosocial Clinical Specialist consults as needed  2/11/2025 2141 by Augusta Cornejo RN  Outcome: Progressing  2/11/2025 0925 by Anya Macdonald RN  Outcome: Progressing     Problem: Decision Making  Goal: Pt/Family able to effectively weigh alternatives and participate in decision making related to treatment and care  Description: INTERVENTIONS:  1. Determine when there are differences between patient's view, family's view, and healthcare provider's view of condition  2. Facilitate patient and family articulation of goals for care  3. Help patient and family identify pros/cons of alternative solutions  4. Provide information as requested by patient/family  5. Respect patient/family right to receive or not to receive information  6. Serve as a liaison between patient and family and health care team  7.

## 2025-02-12 NOTE — BH NOTE
Behavioral Health Treatment Team Note     Patient goal(s) for today: \"to go home\"  Treatment team focus/goals: continue medication management, group therapy, maintain ADLs and provide a safe discharge    Progress note: Pt presented with a broad affect and congruent mood. Pt denied any SI/HI/Avh at the time and was orientated x4. Pt completed many of the packets and reported that they have helped him remember healthy coping skills he learned in the past. He shared that he is going to start working on wood work again and he has been verbally communicating better with his family. Another coping skills he is going to do read more and get back into [employer]. 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 Pt thanked the staff for there help and \"truly appreciated\" the care. Pt is connected with [facility] and has an appointment scheduled for 4-1-25 at 3pm. An inpatient level of care is needed to further stabilize pt on new medication.     LOS:  2  Expected LOS: 5-7 days    Insurance info/prescription coverage:  [facility]  Date of last family contact:  2-11-25  Family requesting physician contact today:  No  Discharge plan:  to stabilize the pt   Guns in the home:  No   Outpatient provider(s):  [facility]    Participating treatment team members: Jeferson Solis, * (assigned SW), Sofya Rojas LMSW

## 2025-02-13 VITALS
HEART RATE: 79 BPM | TEMPERATURE: 97.7 F | DIASTOLIC BLOOD PRESSURE: 89 MMHG | WEIGHT: 159.99 LBS | SYSTOLIC BLOOD PRESSURE: 129 MMHG | RESPIRATION RATE: 18 BRPM | HEIGHT: 71 IN | OXYGEN SATURATION: 100 % | BODY MASS INDEX: 22.4 KG/M2

## 2025-02-13 PROCEDURE — 6370000000 HC RX 637 (ALT 250 FOR IP): Performed by: PSYCHIATRY & NEUROLOGY

## 2025-02-13 RX ORDER — BUPROPION HYDROCHLORIDE 150 MG/1
150 TABLET, EXTENDED RELEASE ORAL 2 TIMES DAILY
Qty: 60 TABLET | Refills: 1 | Status: SHIPPED | OUTPATIENT
Start: 2025-02-13

## 2025-02-13 RX ORDER — BUSPIRONE HYDROCHLORIDE 10 MG/1
10 TABLET ORAL 3 TIMES DAILY
Qty: 60 TABLET | Refills: 1 | Status: SHIPPED | OUTPATIENT
Start: 2025-02-13

## 2025-02-13 RX ORDER — COLCHICINE 0.6 MG/1
0.6 TABLET ORAL 2 TIMES DAILY PRN
Qty: 30 TABLET | Refills: 3 | Status: SHIPPED | OUTPATIENT
Start: 2025-02-13

## 2025-02-13 RX ORDER — TRAZODONE HYDROCHLORIDE 50 MG/1
50 TABLET ORAL NIGHTLY PRN
Qty: 30 TABLET | Refills: 1 | Status: SHIPPED | OUTPATIENT
Start: 2025-02-13

## 2025-02-13 RX ORDER — QUETIAPINE FUMARATE 50 MG/1
50 TABLET, FILM COATED ORAL 2 TIMES DAILY
Qty: 60 TABLET | Refills: 1 | Status: SHIPPED | OUTPATIENT
Start: 2025-02-13

## 2025-02-13 RX ORDER — PANTOPRAZOLE SODIUM 40 MG/1
40 TABLET, DELAYED RELEASE ORAL
Qty: 30 TABLET | Refills: 3 | Status: SHIPPED | OUTPATIENT
Start: 2025-02-14

## 2025-02-13 RX ORDER — NICOTINE 21 MG/24HR
1 PATCH, TRANSDERMAL 24 HOURS TRANSDERMAL DAILY
Qty: 30 PATCH | Refills: 1 | Status: SHIPPED | OUTPATIENT
Start: 2025-02-13

## 2025-02-13 RX ORDER — TOPIRAMATE 25 MG/1
25 TABLET, FILM COATED ORAL 2 TIMES DAILY
Qty: 60 TABLET | Refills: 1 | Status: SHIPPED | OUTPATIENT
Start: 2025-02-13

## 2025-02-13 RX ADMIN — PANTOPRAZOLE SODIUM 40 MG: 40 TABLET, DELAYED RELEASE ORAL at 06:08

## 2025-02-13 RX ADMIN — FOLIC ACID 1 MG: 1 TABLET ORAL at 09:19

## 2025-02-13 RX ADMIN — TRAZODONE HYDROCHLORIDE 50 MG: 50 TABLET ORAL at 00:37

## 2025-02-13 RX ADMIN — BUPROPION HYDROCHLORIDE 150 MG: 150 TABLET, FILM COATED, EXTENDED RELEASE ORAL at 09:19

## 2025-02-13 RX ADMIN — TOPIRAMATE 25 MG: 25 TABLET, FILM COATED ORAL at 09:19

## 2025-02-13 RX ADMIN — Medication 400 MG: at 09:19

## 2025-02-13 RX ADMIN — BUSPIRONE HYDROCHLORIDE 10 MG: 10 TABLET ORAL at 09:19

## 2025-02-13 RX ADMIN — THIAMINE HCL TAB 100 MG 100 MG: 100 TAB at 09:19

## 2025-02-13 RX ADMIN — QUETIAPINE FUMARATE 50 MG: 25 TABLET ORAL at 09:19

## 2025-02-13 NOTE — BH NOTE
Patient discharged in stable mental and physical health. Patient verbalized understanding of discharge instructions. Belongings from security and storage returned to patient. No physical complaints at time of discharge. Patient was future focused and goal oriented. Denied SI/HI/AVH. Escorted off unit by staff at 1041, leaving in a personal vehicle with his wife.

## 2025-02-13 NOTE — GROUP NOTE
Group Therapy Note    Date: 2/13/2025    Group Start Time: 0935  Group End Time: 1015  Group Topic: Education Group - Inpatient    SSR 2  NON ACUTE    Isadora Yates        Group Therapy Note    Setting Goals/Facilitated discussion focused on “stepping up to a better you” by taking steps to improve in their well-being and identifying goals that would help to ensure follow-up      Attendees: 2/6       Notes:  Encouraged but did not attend    Discipline Responsible: Recreational Therapist      Signature:  MARY Mattson

## 2025-02-13 NOTE — PLAN OF CARE
Problem: Anxiety  Goal: Will report anxiety at manageable levels  Description: INTERVENTIONS:  1. Administer medication as ordered  2. Teach and rehearse alternative coping skills  3. Provide emotional support with 1:1 interaction with staff  2/13/2025 0946 by Talia White RN  Outcome: Adequate for Discharge  Flowsheets  Taken 2/13/2025 0943 by Talia White RN  Will report anxiety at manageable levels:   Administer medication as ordered   Teach and rehearse alternative coping skills   Provide emotional support with 1:1 interaction with staff  Taken 2/12/2025 2301 by Augusta Cornejo RN  Will report anxiety at manageable levels: Administer medication as ordered  2/12/2025 2245 by Augusta Cornejo RN  Outcome: Progressing     Problem: Coping  Goal: Pt/Family able to verbalize concerns and demonstrate effective coping strategies  Description: INTERVENTIONS:  1. Assist patient/family to identify coping skills, available support systems and cultural and spiritual values  2. Provide emotional support, including active listening and acknowledgement of concerns of patient and caregivers  3. Reduce environmental stimuli, as able  4. Instruct patient/family in relaxation techniques, as appropriate  5. Assess for spiritual pain/suffering and initiate Spiritual Care, Psychosocial Clinical Specialist consults as needed  2/13/2025 0946 by Talia White RN  Outcome: Adequate for Discharge  Flowsheets  Taken 2/13/2025 0943 by Talia White RN  Patient/family able to verbalize anxieties, fears, and concerns, and demonstrate effective coping:   Assist patient/family to identify coping skills, available support systems and cultural and spiritual values   Provide emotional support, including active listening and acknowledgement of concerns of patient and caregivers   Reduce environmental stimuli, as able  Taken 2/12/2025 2301 by Augusta Cornejo RN  Patient/family able to verbalize

## 2025-02-13 NOTE — PLAN OF CARE
Jeferson is AOX4, ambulatory and able to make needs known. Pt has been interacting well with peers and staff, limited due to isolation. Pt encouraged to attend groups to help learn new coping skills, was in the 2000 music group. Pt reported mild left elbow pain, anxiety 3/10 and depression 2/10. Pt given all HS medications and PRNs as ordered, tolerated well. Pt denied SI/HI/AVH. Pt given HS snacks and fluids as tolerated. Staff will continue care plan as ordered.  Notified Boris [facility] for orders pt c/o GI reflux increased after getting Maalox around 1940, TORB awaiting approval. Pt has been tossing and turning requested PRN trazadone for sleep, tolerated well.      Problem: Discharge Planning  Goal: Discharge to home or other facility with appropriate resources  Outcome: Progressing     Problem: Anxiety  Goal: Will report anxiety at manageable levels  Description: INTERVENTIONS:  1. Administer medication as ordered  2. Teach and rehearse alternative coping skills  3. Provide emotional support with 1:1 interaction with staff  2/12/2025 2245 by Augusta Cornejo, RN  Outcome: Progressing  2/12/2025 1012 by Talia White, RN  Outcome: Progressing  Flowsheets (Taken 2/12/2025 1005)  Will report anxiety at manageable levels:   Administer medication as ordered   Teach and rehearse alternative coping skills   Provide emotional support with 1:1 interaction with staff     Problem: Coping  Goal: Pt/Family able to verbalize concerns and demonstrate effective coping strategies  Description: INTERVENTIONS:  1. Assist patient/family to identify coping skills, available support systems and cultural and spiritual values  2. Provide emotional support, including active listening and acknowledgement of concerns of patient and caregivers  3. Reduce environmental stimuli, as able  4. Instruct patient/family in relaxation techniques, as appropriate  5. Assess for spiritual pain/suffering and initiate Spiritual Care, Psychosocial

## 2025-02-13 NOTE — GROUP NOTE
Group Therapy Note    Date: 2/12/2025    Group Start Time: 1830  Group End Time: 1930  Group Topic: Recreational    SSR 2 BH NON ACUTE    Kyara Pizarro        Group Therapy Note    Attendees: 5/6    Recreational Therapist facilitated structured leisure skills group to introduce healthy leisure skills as positive way to cope and manage mood.           Patient's Goal:  Client will learn and demonstrate effective coping skills      Notes: Attended group and actively participated. Pt listened to songs with peers.  Pt was receptive to intervention.    Status After Intervention:  Improved    Participation Level: Active Listener    Participation Quality: Appropriate      Speech:  normal      Thought Process/Content: Logical      Affective Functioning: Congruent      Mood:  calm       Level of consciousness:  Alert      Response to Learning: Progressing to goal      Endings: None Reported    Modes of Intervention: Activity      Discipline Responsible: Recreational Therapist      Signature:  MARY [place]

## 2025-02-17 NOTE — DISCHARGE SUMMARY
Years since quittin.8    Smokeless tobacco: Never   Substance and Sexual Activity    Alcohol use: Not Currently     Alcohol/week: 24.0 standard drinks of alcohol    Drug use: Never    Sexual activity: Not on file   Other Topics Concern    Not on file   Social History Narrative    Not on file     Social Determinants of Health     Financial Resource Strain: Medium Risk (3/1/2021)    Received from [facility]  (prior to 2023)    Overall Financial Resource Strain (CARDIA)     Difficulty of Paying Living Expenses: Somewhat hard   Food Insecurity: No Food Insecurity (2/10/2025)    Hunger Vital Sign     Worried About Running Out of Food in the Last Year: Never true     Ran Out of Food in the Last Year: Never true   Transportation Needs: No Transportation Needs (2/10/2025)    PRAPARE - Transportation     Lack of Transportation (Medical): No     Lack of Transportation (Non-Medical): No   Physical Activity: Sufficiently Active (3/1/2021)    Received from [facility]  (prior to 2023)    Exercise Vital Sign     Days of Exercise per Week: 5 days     Minutes of Exercise per Session: 150+ min   Stress: Stress Concern Present (3/1/2021)    Received from [facility]  (prior to 2023)    [employer] - Occupational Stress Questionnaire     Feeling of Stress : To some extent   Social Connections: Socially Isolated (3/1/2021)    Received from [facility]  (prior to 2023)    Social Connection and Isolation Panel [NHANES]     Frequency of Communication with Friends and Family: More than three times a week     Frequency of Social Gatherings with Friends and Family: Once a week     Attends [employer]ology Services: Never     Active Member of Clubs or Organizations: No     Attends Club or Organization Meetings: Never     Marital Status:    Intimate Partner Violence: Not on file   Housing Stability: High Risk (2/10/2025)